# Patient Record
Sex: MALE | Race: BLACK OR AFRICAN AMERICAN | NOT HISPANIC OR LATINO | Employment: FULL TIME | URBAN - METROPOLITAN AREA
[De-identification: names, ages, dates, MRNs, and addresses within clinical notes are randomized per-mention and may not be internally consistent; named-entity substitution may affect disease eponyms.]

---

## 2017-07-17 ENCOUNTER — HOSPITAL ENCOUNTER (OUTPATIENT)
Dept: RADIOLOGY | Facility: HOSPITAL | Age: 42
Discharge: HOME | End: 2017-07-17
Attending: ORTHOPAEDIC SURGERY
Payer: COMMERCIAL

## 2017-07-17 ENCOUNTER — OFFICE VISIT (OUTPATIENT)
Dept: SPORTS MEDICINE | Facility: CLINIC | Age: 42
End: 2017-07-17
Payer: COMMERCIAL

## 2017-07-17 VITALS
DIASTOLIC BLOOD PRESSURE: 77 MMHG | SYSTOLIC BLOOD PRESSURE: 126 MMHG | BODY MASS INDEX: 34.04 KG/M2 | HEIGHT: 69 IN | HEART RATE: 64 BPM | WEIGHT: 229.81 LBS

## 2017-07-17 DIAGNOSIS — M75.22 BICEPS TENDINITIS OF LEFT UPPER EXTREMITY: ICD-10-CM

## 2017-07-17 DIAGNOSIS — M25.512 LEFT SHOULDER PAIN, UNSPECIFIED CHRONICITY: ICD-10-CM

## 2017-07-17 DIAGNOSIS — G89.29 CHRONIC LEFT SHOULDER PAIN: ICD-10-CM

## 2017-07-17 DIAGNOSIS — M25.512 CHRONIC LEFT SHOULDER PAIN: ICD-10-CM

## 2017-07-17 DIAGNOSIS — M25.512 LEFT SHOULDER PAIN, UNSPECIFIED CHRONICITY: Primary | ICD-10-CM

## 2017-07-17 DIAGNOSIS — S43.432A SLAP LESION OF LEFT SHOULDER: ICD-10-CM

## 2017-07-17 DIAGNOSIS — M75.101 TEAR OF RIGHT ROTATOR CUFF, UNSPECIFIED TEAR EXTENT: ICD-10-CM

## 2017-07-17 PROCEDURE — 99999 PR PBB SHADOW E&M-EST. PATIENT-LVL IV: CPT | Mod: PBBFAC,,, | Performed by: ORTHOPAEDIC SURGERY

## 2017-07-17 PROCEDURE — 99214 OFFICE O/P EST MOD 30 MIN: CPT | Mod: S$GLB,,, | Performed by: ORTHOPAEDIC SURGERY

## 2017-07-17 PROCEDURE — 73030 X-RAY EXAM OF SHOULDER: CPT | Mod: TC,PO,LT

## 2017-07-17 PROCEDURE — 73030 X-RAY EXAM OF SHOULDER: CPT | Mod: 26,LT,, | Performed by: RADIOLOGY

## 2017-07-17 RX ORDER — MELOXICAM 15 MG/1
15 TABLET ORAL DAILY
Qty: 30 TABLET | Refills: 1 | Status: SHIPPED | OUTPATIENT
Start: 2017-07-17 | End: 2017-08-14 | Stop reason: SDUPTHER

## 2017-07-17 NOTE — PATIENT INSTRUCTIONS
Understanding Biceps Tendonitis    A tendon is a strong band of tissue that connects muscle to bone. The biceps muscle is in the front of the upper arm. It helps with movements such as bending the elbow or raising the arm. The upper end of the biceps muscle is called the proximal end. It has two tendons called the long head and the short head. These tendons attach the muscle to the bones in the shoulder. Biceps tendonitis occurs when either of these tendons is irritated or red and swollen (inflamed). Most cases involve the long head.  Causes of biceps tendonitis  Causes can include:  · Wear and tear of the tendon from aging or normal use over time  · Overuse of the tendon from sports or work activities, especially those that involve repeated overhead movements  · Injury to the tendon from a fall or other accident  · Other problems in the shoulder, such as shoulder impingement or a rotator cuff tear  Symptoms of biceps tendonitis  Common symptoms include:  · Pain in the front of the shoulder that may also travel down the arm. The pain may be worse with activity and at night.  · Swelling in the shoulder  · Clicking or catching sensation when using the arm and shoulder  · Trouble moving the arm and shoulder  Treating biceps tendonitis  Treatment for biceps tendonitis may include:  · Resting the arm and shoulder. This involves limiting certain movements, such as reaching above the head or raising the arm. These can slow healing and make symptoms worse. You may also need to limit certain sports and types of work for a time.  · Cold therapy. This involves using items such as ice packs to help relieve symptoms. Cold can help reduce pain and swelling.  · Medicines. These help relieve pain and swelling. NSAIDs (nonsteroidal anti-inflammatory drugs) are the most common medicines used. Medicines may be prescribed or bought over-the-counter. They may be given as pills. Or they may be applied to the skin in the form of a gel,  cream, or patch.  · Injections of medicine into the injured area. These help relieve pain and swelling for a time.  · Physical therapy and exercises.  These help improve strength and range of motion in the arm and shoulder.  Possible complications  · If the tendon isnt given time to heal, symptoms may worsen. Also, the tendon may tear (rupture).  · If the tendon ruptures or doesnt get better with treatment, your healthcare provider may recommend surgery. This most often involves repairing and reattaching the tendon.     When to call your healthcare provider  Call your healthcare provider right away if you have any of these:  · Fever of 100.4°F (38°C) or higher, or as directed  · Symptoms that dont get better with treatment, or get worse  · Weakness or instability in the arm or shoulder  · Sudden sharp pain, bruising, swelling, popping or snapping sensation, or bulge in the upper arm or shoulder  · New symptoms   Date Last Reviewed: 3/10/2016  © 9112-2093 [employer]. [address] [place] [address]. All rights reserved. This information is not intended as a substitute for professional medical care. Always follow your healthcare professional's instructions.        SLAP Lesion of the Shoulder Joint  The shoulder is the most mobile joint in your body. The ball (or head) of the arm bone (humerus) rests in a shallow socket called the glenoid, much like a golf ball fits on a shelley. To help make the socket deeper, the outer rim of the glenoid is ringed by tough, flexible tissue called the labrum.    What Is a SLAP Lesion?  SLAP stands for Superior Labrum Anterior to Posterior. This means that the upper rim of the labrum has been torn from front to back. The tear happens where the biceps tendon anchors to the labrum. Common causes of a SLAP lesion include:  · Falling on an outstretched hand  · Forceful lifting  · Repeated overhead motions (such as throwing)  Diagnosing a SLAP lesion  To diagnose  the problem, your healthcare provider will examine your arm and shoulder. This includes moving your arm in certain directions to test for symptoms. Imaging tests, such as an MRI, arthrogram, or CT scan, may also be done to evaluate the lesion and to look for other injuries like fractures or rotator cuff tears. These provide your healthcare provider with a detailed view of the tissues inside your shoulder joint.   Treating a SLAP lesion  Rest and anti-inflammatory medicines are often the first line of treatment. Physical therapy can also be used to strengthen the muscles in the shoulder. This helps keep the joint stable. If these treatments arent enough, your healthcare provider may recommend surgery to repair the labrum.  Date Last Reviewed: 10/12/2015  © 3988-6142 [employer]. [address] [place] [address], [place] [address]. All rights reserved. This information is not intended as a substitute for professional medical care. Always follow your healthcare professional's instructions.        Shoulder Arthroscopy: Conditions Treated  You will be given instructions for how to prepare for your surgery and when you should arrive at the hospital or surgery center. Just before surgery, a doctor will talk to you about the anesthesia that will be used to keep you free of pain during surgery. You may be asked by several people to confirm which shoulder is being operated on. This is for your safety. Below are common shoulder problems and how they are treated during arthroscopy.       Impingement  · Repeated overhead movements can inflame your rotator cuff and bursa. A bone spur may also form. This causes pain and problems with certain arm movements. Impingement is also called bursitis or tendinitis.  · During surgery, an inflamed bursa may be removed. Bone may be trimmed. And the coracoacromial ligament may be detached. These steps make more room, relieving pressure and allowing the arm to move more freely.    Torn  rotator cuff  · A rotator cuff can tear due to a sudden injury or from overuse. This can cause pain, weakness, and loss of normal shoulder movement.  · During surgery, torn rotator cuff tendons may be trimmed. The tendons are then reattached to the humerus. This is done with stitches, anchors, or surgical tacks.    Stretched capsule  · A stretched capsule will remain loose. A loose capsule cant hold the joint firmly in place. The bones of the joint may feel like they move too much and the shoulder can dislocate.  · During arthroscopy, a stretched capsule is folded over itself and sutured in place. (This is done from inside the capsule.) This tightens the capsule, helping make the shoulder joint more stable.    Torn labrum  · The shoulder is a ball and socket joint.  The labrum normally supports and cushions the shoulder joint. In the case of a torn labrum, the socket that the ball (the upper end of your arm) fits into is not very deep. The shallow socket increases the  potential for instability.  · The labrum may tear off the rim of the glenoid. This can cause the joint to catch or feel like its slipping out of place. The shoulder may even dislocate.  · A torn labrum is repaired by reattaching it to the glenoid. This is often done with special anchors put into the glenoid bone. Sutures attached to the anchors are tied to hold the labrum in place. The joint then feels more stable.       Arthritis and loose bodies  · Arthritis is damage to joint cartilage with age and use. Injury or disease can also cause it. Wear and tear may also lead to loose bodies (pieces of bone or cartilage) or bone spurs in a joint.  · During surgery, bone spurs are removed. Rough parts of the joint are smoothed. Any loose body is removed from the joint. Bone may also be scraped or shaved to promote new cartilage growth.     Date Last Reviewed: 8/31/2015  © 6149-7836 [employer]. [address] [place] [address], [place] [address]. All  rights reserved. This information is not intended as a substitute for professional medical care. Always follow your healthcare professional's instructions.

## 2017-07-17 NOTE — PROGRESS NOTES
Subjective:          Chief Complaint: John Adair Barthelemy is a 42 y.o. male who had concerns including Pain of the Left Shoulder.    41 yo male complains of left shoulder pain after doing burpees 6 months ago; patient complains of night pain            Review of Systems   Constitution: Negative for fever and night sweats.   HENT: Negative for hearing loss.    Eyes: Negative for blurred vision and visual disturbance.   Cardiovascular: Negative for chest pain and leg swelling.   Respiratory: Negative for shortness of breath.    Endocrine: Negative for polyuria.   Hematologic/Lymphatic: Negative for bleeding problem.   Skin: Negative for rash.   Musculoskeletal: Negative for back pain, joint pain, joint swelling, muscle cramps and muscle weakness.   Gastrointestinal: Negative for melena.   Genitourinary: Negative for hematuria.   Neurological: Negative for loss of balance, numbness and paresthesias.   Psychiatric/Behavioral: Negative for altered mental status.       Pain Related Questions  Over the past 3 days, what was your average pain during activity? (I.e. running, jogging, walking, climbing stairs, getting dressed, ect.): 4  Over the past 3 days, what was your highest pain level?: 9  Over the past 3 days, what was your lowest pain level? : 1    Other  How many nights a week are you awakened by your affected body part?: 0  Was the patient's HEIGHT measured or patient reported?: Patient Reported  Was the patient's WEIGHT measured or patient reported?: Measured      Objective:        General: Jens is well-developed, well-nourished, appears stated age, in no acute distress, alert and oriented to time, place and person.     General    Vitals reviewed.  Constitutional: He is oriented to person, place, and time. He appears well-developed and well-nourished. No distress.   HENT:   Nose: Nose normal.   Mouth/Throat: No oropharyngeal exudate.   Eyes: Pupils are equal, round, and reactive to light. Right eye exhibits no  discharge. Left eye exhibits no discharge.   Neck: Normal range of motion.   Cardiovascular: Normal rate and intact distal pulses.    Pulmonary/Chest: Effort normal and breath sounds normal. No respiratory distress.   Neurological: He is alert and oriented to person, place, and time. He has normal reflexes. He displays normal reflexes. No cranial nerve deficit. Coordination normal.   Psychiatric: He has a normal mood and affect. His behavior is normal. Judgment and thought content normal.         Right Shoulder Exam     Inspection/Observation   Swelling: absent  Bruising: absent  Scars: absent  Deformity: absent  Scapular Winging: absent  Scapular Dyskinesia: negative  Atrophy: absent    Tenderness   The patient is experiencing no tenderness.        Range of Motion   Active Abduction:  90 normal   Passive Abduction:  100 normal   Extension:  0 normal   Forward Flexion:  180 normal   Forward Elevation: 180 normal  Adduction: 40 normal  External Rotation 0 degrees:  60 normal   External Rotation 90 degrees: 90 normal  Internal Rotation 0 degrees:  T10 normal   Internal Rotation 90 degrees:  30 normal     Tests & Signs   Apprehension: negative  Cross Arm: negative  Drop Arm: negative  Hawkin's test: negative  Impingement: negative  Sulcus: absent  Anterosuperior Escape: negative  Lag Sign 0 degrees: negative  Lag Sign 90 degrees: negative  Lift Off Sign: negative  Belly Press: negative  Active Compression Test ([place]'s Sign): negative  Yergason's Test: negative  Speed's Test: negative  Anterior Drawer Test: 1+   Posterior Drawer Test: 1+  Relocation 90 degrees: negative  Relocation > 90 degrees: negative  Bear Hug: negative  Moving Valgus: negative  Jerk Test: negative    Other   Sensation: normal    Left Shoulder Exam     Inspection/Observation   Swelling: absent  Bruising: absent  Scars: absent  Deformity: absent  Scapular Winging: absent  Scapular Dyskinesia: negative  Atrophy: absent    Tenderness   The patient  is experiencing no tenderness.         Range of Motion   Active Abduction:  90 normal   Passive Abduction:  100 normal   Extension:  0 normal   Forward Flexion:  160 normal   Forward Elevation: 160 normal  Adduction: 40 normal  External Rotation 0 degrees:  60 normal   External Rotation 90 degrees: 90 normal  Internal Rotation 0 degrees:  T10 normal   Internal Rotation 90 degrees:  30 normal     Tests & Signs   Apprehension: negative  Cross Arm: negative  Drop Arm: negative  Hawkin's test: negative  Impingement: negative  Sulcus: absent  Anterosuperior Escape: negative  Lag Sign 0 degrees: negative  Lag Sign 90 degrees: negative  Lift Off Sign: negative  Belly Press: negative  Active Compression test ([place]'s Sign): negative  Yergasons's Test: negative  Speed's Test: positive  Anterior Drawer Test: 1+  Posterior Drawer Test: 1+  Relocation 90 degrees: negative  Relocation > 90 degrees: negative  Bear Hug: negative  Moving Valgus: negative  Jerk Test: negative    Other   Sensation: normal       Muscle Strength   Right Upper Extremity   Shoulder Abduction: 5/5   Shoulder Internal Rotation: 5/5   Shoulder External Rotation: 5/5   Supraspinatus: 5/5/5   Subscapularis: 5/5/5   Biceps: 5/5/5   Left Upper Extremity  Shoulder Abduction: 5/5   Shoulder Internal Rotation: 5/5   Shoulder External Rotation: 5/5   Supraspinatus: 5/5/5   Subscapularis: 5/5/5   Biceps: 5/5/5     Reflexes     Left Side  Biceps:  2+  Triceps:  2+  Brachioradialis:  2+    Right Side   Biceps:  2+  Triceps:  2+  Brachioradialis:  2+    Vascular Exam     Right Pulses      Radial:                    2+      Left Pulses      Radial:                    2+      Capillary Refill  Right Hand: normal capillary refill  Left Hand: normal capillary refill      Radiographs: No signs of fracture or dislcoation            Assessment:       Encounter Diagnoses   Name Primary?    Left shoulder pain, unspecified chronicity Yes    Chronic left shoulder pain      Biceps tendinitis of left upper extremity     Tear of right rotator cuff, unspecified tear extent     SLAP lesion of left shoulder           Plan:         1. ASES and SF-12 was filled out today in clinic.     RTC in 3 weeks with Dr. Farzana Mayers. Patient will fill out ASES, SF-12 on return.    2. Pain control:     3. MRI left shoulder arthrogram        4. Patient is allergic to steroids                  [facility] patient questionnaires have been collected today.

## 2017-07-28 ENCOUNTER — HOSPITAL ENCOUNTER (OUTPATIENT)
Dept: RADIOLOGY | Facility: HOSPITAL | Age: 42
Discharge: HOME | End: 2017-07-28
Attending: ORTHOPAEDIC SURGERY
Payer: COMMERCIAL

## 2017-07-28 DIAGNOSIS — M75.101 TEAR OF RIGHT ROTATOR CUFF, UNSPECIFIED TEAR EXTENT: ICD-10-CM

## 2017-07-28 DIAGNOSIS — M25.512 LEFT SHOULDER PAIN, UNSPECIFIED CHRONICITY: ICD-10-CM

## 2017-07-28 DIAGNOSIS — M25.512 CHRONIC LEFT SHOULDER PAIN: ICD-10-CM

## 2017-07-28 DIAGNOSIS — M75.22 BICEPS TENDINITIS OF LEFT UPPER EXTREMITY: ICD-10-CM

## 2017-07-28 DIAGNOSIS — G89.29 CHRONIC LEFT SHOULDER PAIN: ICD-10-CM

## 2017-07-28 PROCEDURE — 25000003 PHARM REV CODE 250: Performed by: ORTHOPAEDIC SURGERY

## 2017-07-28 PROCEDURE — 73040 CONTRAST X-RAY OF SHOULDER: CPT | Mod: 26,,, | Performed by: RADIOLOGY

## 2017-07-28 PROCEDURE — A9585 GADOBUTROL INJECTION: HCPCS | Performed by: ORTHOPAEDIC SURGERY

## 2017-07-28 PROCEDURE — 23350 INJECTION FOR SHOULDER X-RAY: CPT | Mod: ,,, | Performed by: RADIOLOGY

## 2017-07-28 PROCEDURE — 23350 INJECTION FOR SHOULDER X-RAY: CPT | Mod: TC

## 2017-07-28 PROCEDURE — 73222 MRI JOINT UPR EXTREM W/DYE: CPT | Mod: 26,LT,, | Performed by: RADIOLOGY

## 2017-07-28 PROCEDURE — 25500020 PHARM REV CODE 255: Performed by: ORTHOPAEDIC SURGERY

## 2017-07-28 PROCEDURE — 73222 MRI JOINT UPR EXTREM W/DYE: CPT | Mod: TC,LT

## 2017-07-28 RX ORDER — GADOBUTROL 604.72 MG/ML
7 INJECTION INTRAVENOUS
Status: COMPLETED | OUTPATIENT
Start: 2017-07-28 | End: 2017-07-28

## 2017-07-28 RX ORDER — LIDOCAINE HYDROCHLORIDE 10 MG/ML
3 INJECTION, SOLUTION EPIDURAL; INFILTRATION; INTRACAUDAL; PERINEURAL ONCE
Status: COMPLETED | OUTPATIENT
Start: 2017-07-28 | End: 2017-07-28

## 2017-07-28 RX ADMIN — IOHEXOL 9 ML: 300 INJECTION, SOLUTION INTRAVENOUS at 03:07

## 2017-07-28 RX ADMIN — LIDOCAINE HYDROCHLORIDE 30 MG: 10 INJECTION, SOLUTION EPIDURAL; INFILTRATION; INTRACAUDAL; PERINEURAL at 03:07

## 2017-07-28 RX ADMIN — GADOBUTROL 7 ML: 604.72 INJECTION INTRAVENOUS at 03:07

## 2017-08-14 ENCOUNTER — OFFICE VISIT (OUTPATIENT)
Dept: SPORTS MEDICINE | Facility: CLINIC | Age: 42
End: 2017-08-14
Payer: COMMERCIAL

## 2017-08-14 VITALS
HEIGHT: 69 IN | DIASTOLIC BLOOD PRESSURE: 66 MMHG | SYSTOLIC BLOOD PRESSURE: 111 MMHG | WEIGHT: 220 LBS | BODY MASS INDEX: 32.58 KG/M2 | HEART RATE: 48 BPM

## 2017-08-14 DIAGNOSIS — M75.101 TEAR OF RIGHT ROTATOR CUFF, UNSPECIFIED TEAR EXTENT: ICD-10-CM

## 2017-08-14 DIAGNOSIS — G89.29 CHRONIC LEFT SHOULDER PAIN: ICD-10-CM

## 2017-08-14 DIAGNOSIS — S43.432A SLAP LESION OF LEFT SHOULDER: ICD-10-CM

## 2017-08-14 DIAGNOSIS — M25.512 LEFT SHOULDER PAIN, UNSPECIFIED CHRONICITY: ICD-10-CM

## 2017-08-14 DIAGNOSIS — M75.22 BICEPS TENDINITIS OF LEFT UPPER EXTREMITY: Primary | ICD-10-CM

## 2017-08-14 DIAGNOSIS — M25.512 CHRONIC LEFT SHOULDER PAIN: ICD-10-CM

## 2017-08-14 PROCEDURE — 3008F BODY MASS INDEX DOCD: CPT | Mod: S$GLB,,, | Performed by: ORTHOPAEDIC SURGERY

## 2017-08-14 PROCEDURE — 99214 OFFICE O/P EST MOD 30 MIN: CPT | Mod: S$GLB,,, | Performed by: ORTHOPAEDIC SURGERY

## 2017-08-14 PROCEDURE — 99999 PR PBB SHADOW E&M-EST. PATIENT-LVL IV: CPT | Mod: PBBFAC,,, | Performed by: ORTHOPAEDIC SURGERY

## 2017-08-14 RX ORDER — MELOXICAM 15 MG/1
15 TABLET ORAL DAILY
Qty: 30 TABLET | Refills: 3 | Status: SHIPPED | OUTPATIENT
Start: 2017-08-14 | End: 2018-11-23

## 2017-08-14 NOTE — PROGRESS NOTES
Subjective:          Chief Complaint: John Adair Barthelemy is a 42 y.o. male who had concerns including Follow-up of the Left Shoulder.    Patient is here for a follow up for his left shoulder to review his MRI results.             Review of Systems   Constitution: Negative for fever and night sweats.   HENT: Negative for hearing loss.    Eyes: Negative for blurred vision and visual disturbance.   Cardiovascular: Negative for chest pain and leg swelling.   Respiratory: Negative for shortness of breath.    Endocrine: Negative for polyuria.   Hematologic/Lymphatic: Negative for bleeding problem.   Skin: Negative for rash.   Musculoskeletal: Negative for back pain, joint pain, joint swelling, muscle cramps and muscle weakness.   Gastrointestinal: Negative for melena.   Genitourinary: Negative for hematuria.   Neurological: Negative for loss of balance, numbness and paresthesias.   Psychiatric/Behavioral: Negative for altered mental status.       Pain Related Questions  Over the past 3 days, what was your average pain during activity? (I.e. running, jogging, walking, climbing stairs, getting dressed, ect.): 8  Over the past 3 days, what was your highest pain level?: 8  Over the past 3 days, what was your lowest pain level? : 2    Other  How many nights a week are you awakened by your affected body part?: 4  Was the patient's HEIGHT measured or patient reported?: Patient Reported  Was the patient's WEIGHT measured or patient reported?: Measured      Objective:        General: Jens is well-developed, well-nourished, appears stated age, in no acute distress, alert and oriented to time, place and person.     General    Vitals reviewed.  Constitutional: He is oriented to person, place, and time. He appears well-developed and well-nourished. No distress.   HENT:   Nose: Nose normal.   Mouth/Throat: No oropharyngeal exudate.   Eyes: Pupils are equal, round, and reactive to light. Right eye exhibits no discharge. Left eye  exhibits no discharge.   Neck: Normal range of motion.   Cardiovascular: Normal rate and intact distal pulses.    Pulmonary/Chest: Effort normal and breath sounds normal. No respiratory distress.   Neurological: He is alert and oriented to person, place, and time. He has normal reflexes. He displays normal reflexes. No cranial nerve deficit. Coordination normal.   Psychiatric: He has a normal mood and affect. His behavior is normal. Judgment and thought content normal.         Right Shoulder Exam     Inspection/Observation   Swelling: absent  Bruising: absent  Scars: absent  Deformity: absent  Scapular Winging: absent  Scapular Dyskinesia: negative  Atrophy: absent    Tenderness   The patient is experiencing no tenderness.        Range of Motion   Active Abduction:  90 normal   Passive Abduction:  100 normal   Extension:  0 normal   Forward Flexion:  180 normal   Forward Elevation: 180 normal  Adduction: 40 normal  External Rotation 0 degrees:  60 normal   External Rotation 90 degrees: 90 normal  Internal Rotation 0 degrees:  T10 normal   Internal Rotation 90 degrees:  30 normal     Tests & Signs   Apprehension: negative  Cross Arm: negative  Drop Arm: negative  Hawkin's test: negative  Impingement: negative  Sulcus: absent  Anterosuperior Escape: negative  Lag Sign 0 degrees: negative  Lag Sign 90 degrees: negative  Lift Off Sign: negative  Belly Press: negative  Active Compression Test ([place]'s Sign): negative  Yergason's Test: negative  Speed's Test: negative  Anterior Drawer Test: 1+   Posterior Drawer Test: 1+  Relocation 90 degrees: negative  Relocation > 90 degrees: negative  Bear Hug: negative  Moving Valgus: negative  Jerk Test: negative    Other   Sensation: normal    Left Shoulder Exam     Inspection/Observation   Swelling: absent  Bruising: absent  Scars: absent  Deformity: absent  Scapular Winging: absent  Scapular Dyskinesia: negative  Atrophy: absent    Tenderness   The patient is experiencing no  tenderness.         Range of Motion   Active Abduction:  90 normal   Passive Abduction:  100 normal   Extension:  0 normal   Forward Flexion:  160 normal   Forward Elevation: 160 normal  Adduction: 40 normal  External Rotation 0 degrees:  60 normal   External Rotation 90 degrees: 90 normal  Internal Rotation 0 degrees:  T10 normal   Internal Rotation 90 degrees:  30 normal     Tests & Signs   Apprehension: negative  Cross Arm: negative  Drop Arm: negative  Hawkin's test: negative  Impingement: negative  Sulcus: absent  Anterosuperior Escape: negative  Lag Sign 0 degrees: negative  Lag Sign 90 degrees: negative  Lift Off Sign: negative  Belly Press: negative  Active Compression test ([place]'s Sign): negative  Yergasons's Test: negative  Speed's Test: positive  Anterior Drawer Test: 1+  Posterior Drawer Test: 1+  Relocation 90 degrees: negative  Relocation > 90 degrees: negative  Bear Hug: negative  Moving Valgus: negative  Jerk Test: negative    Other   Sensation: normal       Muscle Strength   Right Upper Extremity   Shoulder Abduction: 5/5   Shoulder Internal Rotation: 5/5   Shoulder External Rotation: 5/5   Supraspinatus: 5/5/5   Subscapularis: 5/5/5   Biceps: 5/5/5   Left Upper Extremity  Shoulder Abduction: 5/5   Shoulder Internal Rotation: 5/5   Shoulder External Rotation: 5/5   Supraspinatus: 5/5/5   Subscapularis: 5/5/5   Biceps: 5/5/5     Reflexes     Left Side  Biceps:  2+  Triceps:  2+  Brachioradialis:  2+    Right Side   Biceps:  2+  Triceps:  2+  Brachioradialis:  2+    Vascular Exam     Right Pulses      Radial:                    2+      Left Pulses      Radial:                    2+      Capillary Refill  Right Hand: normal capillary refill  Left Hand: normal capillary refill      MRI RESULTS  ROTATOR CUFF:  Supraspinatus: Intact  Infraspinatus:  Intact.  Subscapularis:  Intact.  Teres Minor:  Intact.    LABRUM:   There is no labral tear.    LONG HEAD BICEPS TENDON:  Located and intact.  The biceps  anchor is located posteriorly on the superior glenoid.  Suspect mild biceps tendinosis    BONES:  No focal or diffuse abnormality. No significant AC joint arthrosis.    CARTILAGE:  Preserved.    MUSCLES:  Normal bulk and signal    MR arthrogram LEFT shoulder     COMPARISON: None.    TECHNIQUE: Coronal and sagittal oblique fat sat T1 and fat sat T2; axial and ABER fat sat T1 sequences through the LEFT shoulder were acquired following the intra-articular administration of gadolinium.  Please see separate report for details of the injection.    FINDINGS:    ROTATOR CUFF:  Supraspinatus: Intact  Infraspinatus:  Intact.  Subscapularis:  Intact.  Teres Minor:  Intact.    LABRUM:   There is no labral tear.    LONG HEAD BICEPS TENDON:  Located and intact.  The biceps anchor is located posteriorly on the superior glenoid.  Suspect mild biceps tendinosis    BONES:  No focal or diffuse abnormality. No significant AC joint arthrosis.    CARTILAGE:  Preserved.    MUSCLES:  Normal bulk and signal   Impression          No evidence for rotator cuff tear or labral tear.  Mild biceps tendinosis.                 Assessment:       Encounter Diagnoses   Name Primary?    Biceps tendinitis of left upper extremity Yes    Chronic left shoulder pain           Plan:         1. ASES and SF-12 was not filled out today in clinic.     RTC in 6 weeks with Dr. Farzana Mayers. Patient will not fill out ASES, SF-12 on return.    2. Meloxicam 15 mg po qd prn    3. PT  For periscapular and RC program - [place] location    4. If no improvement  We reviewed with Jens today, the pathology and natural history of his diagnosis. We have discussed a variety of treatment options including medications, physical therapy and other alternative treatments. I also explained the indications, risks and benefits of surgery. After discussion, Jens decided to proceed with surgery. The decision was made to go forward with   1. Left shoulder arthroscopic bicep  tenodesis  2. Left shoulder arthroscopic debridement    The details of the surgical procedure were explained, including the location of probable incisions and a description of likely hardware and/or grafts to be used.  The patient understands the likely convalescence after surgery.  Also, we have thoroughly discussed the risks, benefits and alternatives to surgery, including, but not limited to, the risk of infection, joint stiffness, blood clot (including DVT and/or pulmonary embolus), neurologic and vascular injury.  It was explained that, if tissue has been repaired or reconstructed, there is a chance of failure, which may require further management.      All of the patient's questions were answered and informed consent was obtained. The patient will contact us if they have any questions or concerns in the interim.                  [facility] patient questionnaires have been collected today.

## 2017-08-22 ENCOUNTER — TELEPHONE (OUTPATIENT)
Dept: SPORTS MEDICINE | Facility: CLINIC | Age: 42
End: 2017-08-22

## 2017-08-22 DIAGNOSIS — G89.29 CHRONIC LEFT SHOULDER PAIN: ICD-10-CM

## 2017-08-22 DIAGNOSIS — M25.512 LEFT SHOULDER PAIN, UNSPECIFIED CHRONICITY: ICD-10-CM

## 2017-08-22 DIAGNOSIS — S43.432A SUPERIOR GLENOID LABRUM LESION OF LEFT SHOULDER, INITIAL ENCOUNTER: ICD-10-CM

## 2017-08-22 DIAGNOSIS — M25.512 CHRONIC LEFT SHOULDER PAIN: ICD-10-CM

## 2017-08-22 DIAGNOSIS — M75.101 TEAR OF RIGHT ROTATOR CUFF, UNSPECIFIED TEAR EXTENT: ICD-10-CM

## 2017-08-22 DIAGNOSIS — M75.22 BICEPS TENDONITIS ON LEFT: Primary | ICD-10-CM

## 2017-08-22 NOTE — TELEPHONE ENCOUNTER
----- Message from Nelly Uribe sent at 8/22/2017 11:18 AM CDT -----  Contact: [facility]  fax   [facility] states need orders and MRI for Left Shoulder fax to office. Patient has an appointment on tomorrow-8/23 @

## 2018-02-26 ENCOUNTER — OFFICE VISIT (OUTPATIENT)
Dept: SPORTS MEDICINE | Facility: CLINIC | Age: 43
End: 2018-02-26
Payer: COMMERCIAL

## 2018-02-26 VITALS
HEIGHT: 69 IN | DIASTOLIC BLOOD PRESSURE: 80 MMHG | SYSTOLIC BLOOD PRESSURE: 129 MMHG | WEIGHT: 220 LBS | BODY MASS INDEX: 32.58 KG/M2 | HEART RATE: 52 BPM

## 2018-02-26 DIAGNOSIS — M75.22 BICEPS TENDINITIS OF LEFT UPPER EXTREMITY: Primary | ICD-10-CM

## 2018-02-26 DIAGNOSIS — G89.29 CHRONIC LEFT SHOULDER PAIN: ICD-10-CM

## 2018-02-26 DIAGNOSIS — M25.512 CHRONIC LEFT SHOULDER PAIN: ICD-10-CM

## 2018-02-26 DIAGNOSIS — M75.102 ROTATOR CUFF SYNDROME OF LEFT SHOULDER: ICD-10-CM

## 2018-02-26 PROCEDURE — 20611 DRAIN/INJ JOINT/BURSA W/US: CPT | Mod: LT,S$GLB,, | Performed by: ORTHOPAEDIC SURGERY

## 2018-02-26 PROCEDURE — 20550 NJX 1 TENDON SHEATH/LIGAMENT: CPT | Mod: 59,LT,S$GLB, | Performed by: ORTHOPAEDIC SURGERY

## 2018-02-26 PROCEDURE — 3008F BODY MASS INDEX DOCD: CPT | Mod: S$GLB,,, | Performed by: ORTHOPAEDIC SURGERY

## 2018-02-26 PROCEDURE — 99214 OFFICE O/P EST MOD 30 MIN: CPT | Mod: 25,S$GLB,, | Performed by: ORTHOPAEDIC SURGERY

## 2018-02-26 PROCEDURE — 99999 PR PBB SHADOW E&M-EST. PATIENT-LVL III: CPT | Mod: PBBFAC,,, | Performed by: ORTHOPAEDIC SURGERY

## 2018-02-26 RX ORDER — TRIAMCINOLONE ACETONIDE 40 MG/ML
80 INJECTION, SUSPENSION INTRA-ARTICULAR; INTRAMUSCULAR
Status: COMPLETED | OUTPATIENT
Start: 2018-02-26 | End: 2018-02-26

## 2018-02-26 RX ORDER — TRIAMCINOLONE ACETONIDE 40 MG/ML
40 INJECTION, SUSPENSION INTRA-ARTICULAR; INTRAMUSCULAR
Status: COMPLETED | OUTPATIENT
Start: 2018-02-26 | End: 2018-02-26

## 2018-02-26 RX ADMIN — TRIAMCINOLONE ACETONIDE 80 MG: 40 INJECTION, SUSPENSION INTRA-ARTICULAR; INTRAMUSCULAR at 11:02

## 2018-02-26 RX ADMIN — TRIAMCINOLONE ACETONIDE 40 MG: 40 INJECTION, SUSPENSION INTRA-ARTICULAR; INTRAMUSCULAR at 11:02

## 2018-02-26 NOTE — PATIENT INSTRUCTIONS
Shoulder Arthroscopy: Conditions Treated  You will be given instructions for how to prepare for your surgery and when you should arrive at the hospital or surgery center. Just before surgery, a doctor will talk to you about the anesthesia that will be used to keep you free of pain during surgery. You may be asked by several people to confirm which shoulder is being operated on. This is for your safety. Below are common shoulder problems and how they are treated during arthroscopy.       Impingement  · Repeated overhead movements can inflame your rotator cuff and bursa. A bone spur may also form. This causes pain and problems with certain arm movements. Impingement is also called bursitis or tendinitis.  · During surgery, an inflamed bursa may be removed. Bone may be trimmed. And the coracoacromial ligament may be detached. These steps make more room, relieving pressure and allowing the arm to move more freely.    Torn rotator cuff  · A rotator cuff can tear due to a sudden injury or from overuse. This can cause pain, weakness, and loss of normal shoulder movement.  · During surgery, torn rotator cuff tendons may be trimmed. The tendons are then reattached to the humerus. This is done with stitches, anchors, or surgical tacks.    Stretched capsule  · A stretched capsule will remain loose. A loose capsule cant hold the joint firmly in place. The bones of the joint may feel like they move too much and the shoulder can dislocate.  · During arthroscopy, a stretched capsule is folded over itself and sutured in place. (This is done from inside the capsule.) This tightens the capsule, helping make the shoulder joint more stable.    Torn labrum  · The shoulder is a ball and socket joint.  The labrum normally supports and cushions the shoulder joint. In the case of a torn labrum, the socket that the ball (the upper end of your arm) fits into is not very deep. The shallow socket increases the  potential for  instability.  · The labrum may tear off the rim of the glenoid. This can cause the joint to catch or feel like its slipping out of place. The shoulder may even dislocate.  · A torn labrum is repaired by reattaching it to the glenoid. This is often done with special anchors put into the glenoid bone. Sutures attached to the anchors are tied to hold the labrum in place. The joint then feels more stable.       Arthritis and loose bodies  · Arthritis is damage to joint cartilage with age and use. Injury or disease can also cause it. Wear and tear may also lead to loose bodies (pieces of bone or cartilage) or bone spurs in a joint.  · During surgery, bone spurs are removed. Rough parts of the joint are smoothed. Any loose body is removed from the joint. Bone may also be scraped or shaved to promote new cartilage growth.  Date Last Reviewed: 8/31/2015  © 4711-4989 [employer]. [address] [place] [address]. All rights reserved. This information is not intended as a substitute for professional medical care. Always follow your healthcare professional's instructions.        Understanding Shoulder Impingement Syndrome    Shoulder impingement syndrome is a problem with the shoulder joint. It occurs when certain parts within the joint swell and are pinched. This can cause nagging pain and problems with moving the arm.  What causes shoulder impingement syndrome?  It is possible to develop impingement after years of normal shoulder use. But in most cases the condition occurs because of repeated overhead movements. These include such things as stocking shelves, painting, swimming, and throwing. These movements can irritate parts of the shoulder, leading to swelling. Swollen parts of the shoulder take up more room, making the joint space smaller. Some parts that may be involved include:  · A sac of fluid (bursa) that cushions the shoulder joint.  When the bursa is irritated, it may lead to a condition  called bursitis. This is when the bursa swells with fluid, filling and squeezing the joint space.  · Fibrous tissues (tendons) that connect muscle to bone. When tendons are irritated, they may become swollen. This is called tendonitis.  · The end (acromion) of the shoulder blade. This bone may be flat or hooked. If the acromion is hooked, the joint space may be smaller than normal. Growths (bone spurs) on the acromion can also narrow the joint space. Acromion problems dont often cause impingement. But they can make it worse.  Symptoms of shoulder impingement syndrome  Symptoms include pain, pinching, or stiffness in your shoulder. Pain often comes with movement, particularly reaching overhead or backward. It may also be felt when the shoulder is at rest. Pain at night during sleep is common.  Treatment for shoulder impingement syndrome  Treatment will depend on the cause of the problem, how bad the problem is, and if other parts of the shoulder are damaged. Treatment may include the following:  · Active rest. This allows the shoulder to heal. It means using the arm and shoulder, but avoiding activities that cause pain. These likely include reaching overhead or sleeping on your shoulder.  · Cold packs. These help reduce swelling and relieve pain.  · Prescription or over-the-counter pain medicines. These help relieve pain and swelling.  · Arm and shoulder exercises. These help keep your shoulder joint mobile as it heals. They also help improve muscle strength around the joint.  · Shots of medicine into the joint. This can help reduce swelling and pain for a short time.  If other measures dont work to relieve symptoms, you may need surgery. This opens up space in the joint to allow pain-free motion.  Possible complications of shoulder impingement syndrome  It might be tempting to stop using your shoulder completely to avoid pain. But doing so may lead to a condition called frozen shoulder. To help prevent this,  follow instructions you are given for active rest and for doing exercises to help your shoulder heal.  When to call your healthcare provider  Call your healthcare provider right away if you have any of these:  · Fever of 100.4°F (38°C) or higher, or as directed  · Symptoms that dont get better, or get worse  · New symptoms   Date Last Reviewed: 3/10/2016  © 0726-6184 [employer]. [address] [place] [address], [place] [address]. All rights reserved. This information is not intended as a substitute for professional medical care. Always follow your healthcare professional's instructions.        Surgery for Shoulder Impingement  For many people, nonoperative treatments are enough to relieve shoulder impingement symptoms. But if these and other treatments havent worked, surgery may be an option. Surgery can help free up the joint space, allowing pain-free motion. Talk to your healthcare provider to see if surgery is right for you.  Surgery for shoulder impingement  The type of surgery you have depends on your shoulder problem. Surgery can remove the bursa if it is swollen. If the coracoacromial ligament is tight, it may be released. If the acromion is hooked or has bone spurs, a portion of it may be removed. Before surgery, youll be given medicine to keep you free from pain. There are different types of surgery, arthroscopy and open surgery:  · During arthroscopy, small incisions are made in the shoulder. Next, a small, lighted instrument (arthroscope) is inserted. A tiny camera is attached on one end of the arthroscope. The camera sends images to a video monitor, allowing the surgeon to see inside the shoulder.  · During open surgery, incisions are made in the shoulder so the surgeon can work inside.     A swollen bursa may be removed.           A tight coracoacromial ligament may be released.           A portion of the acromion may be removed.   Risks and complications of surgery  Your healthcare provider  will discuss the possible risks and complications of the procedure with you. These may include:  · Infection  · Damage to nerves or blood vessels  · Loss of flexibility  · Symptoms do not completely resolve  Date Last Reviewed: 10/12/2015  © 8591-3554 [employer]. [address] [place] [address], [place] [address]. All rights reserved. This information is not intended as a substitute for professional medical care. Always follow your healthcare professional's instructions.

## 2018-02-26 NOTE — PROGRESS NOTES
Subjective:          Chief Complaint: John Adair Barthelemy is a 42 y.o. male who had concerns including Pain of the Left Shoulder.    Patient is here for a follow up for his left shoulder. He did PT at [facility] with little to no improvement. He does feel like the bicep tendon area is better but still has a lot of pain with overhead and quick motions. He had some relief with an patch his PT put on his shoulder but he does not know what it was.         Pain   Pertinent negatives include no chest pain, fever, joint swelling, numbness or rash.       Review of Systems   Constitution: Negative for fever and night sweats.   HENT: Negative for hearing loss.    Eyes: Negative for blurred vision and visual disturbance.   Cardiovascular: Negative for chest pain and leg swelling.   Respiratory: Negative for shortness of breath.    Endocrine: Negative for polyuria.   Hematologic/Lymphatic: Negative for bleeding problem.   Skin: Negative for rash.   Musculoskeletal: Negative for back pain, joint pain, joint swelling, muscle cramps and muscle weakness.   Gastrointestinal: Negative for melena.   Genitourinary: Negative for hematuria.   Neurological: Negative for loss of balance, numbness and paresthesias.   Psychiatric/Behavioral: Negative for altered mental status.       Pain Related Questions  Over the past 3 days, what was your average pain during activity? (I.e. running, jogging, walking, climbing stairs, getting dressed, ect.): 6  Over the past 3 days, what was your highest pain level?: 6  Over the past 3 days, what was your lowest pain level? : 1    Other  How many nights a week are you awakened by your affected body part?: 0  Was the patient's HEIGHT measured or patient reported?: Patient Reported  Was the patient's WEIGHT measured or patient reported?: Measured      Objective:        General: Jens is well-developed, well-nourished, appears stated age, in no acute distress, alert and oriented to time, place and person.      General    Vitals reviewed.  Constitutional: He is oriented to person, place, and time. He appears well-developed and well-nourished. No distress.   HENT:   Nose: Nose normal.   Mouth/Throat: No oropharyngeal exudate.   Eyes: Pupils are equal, round, and reactive to light. Right eye exhibits no discharge. Left eye exhibits no discharge.   Neck: Normal range of motion.   Cardiovascular: Normal rate and intact distal pulses.    Pulmonary/Chest: Effort normal and breath sounds normal. No respiratory distress.   Neurological: He is alert and oriented to person, place, and time. He has normal reflexes. He displays normal reflexes. No cranial nerve deficit. Coordination normal.   Psychiatric: He has a normal mood and affect. His behavior is normal. Judgment and thought content normal.         Right Shoulder Exam     Inspection/Observation   Swelling: absent  Bruising: absent  Scars: absent  Deformity: absent  Scapular Winging: absent  Scapular Dyskinesia: negative  Atrophy: absent    Tenderness   The patient is experiencing no tenderness.        Range of Motion   Active Abduction:  90 normal   Passive Abduction:  100 normal   Extension:  0 normal   Forward Flexion:  180 normal   Forward Elevation: 180 normal  Adduction: 40 normal  External Rotation 0 degrees:  60 normal   External Rotation 90 degrees: 90 normal  Internal Rotation 0 degrees:  T10 normal   Internal Rotation 90 degrees:  30 normal     Tests & Signs   Apprehension: negative  Cross Arm: negative  Drop Arm: negative  Hawkin's test: negative  Impingement: negative  Sulcus: absent  Anterosuperior Escape: negative  Lag Sign 0 degrees: negative  Lag Sign 90 degrees: negative  Lift Off Sign: negative  Belly Press: negative  Active Compression Test ([place]'s Sign): negative  Yergason's Test: negative  Speed's Test: negative  Anterior Drawer Test: 1+   Posterior Drawer Test: 1+  Relocation 90 degrees: negative  Relocation > 90 degrees: negative  Bear Hug:  negative  Moving Valgus: negative  Jerk Test: negative    Other   Sensation: normal    Left Shoulder Exam     Inspection/Observation   Swelling: absent  Bruising: absent  Scars: absent  Deformity: absent  Scapular Winging: absent  Scapular Dyskinesia: negative  Atrophy: absent    Tenderness   The patient is experiencing no tenderness.         Range of Motion   Active Abduction:  90 normal   Passive Abduction:  100 normal   Extension:  0 normal   Forward Flexion:  160 normal   Forward Elevation: 160 normal  Adduction: 40 normal  External Rotation 0 degrees:  60 normal   External Rotation 90 degrees: 90 normal  Internal Rotation 0 degrees:  T10 normal   Internal Rotation 90 degrees:  30 normal     Tests & Signs   Apprehension: negative  Cross Arm: negative  Drop Arm: negative  Hawkin's test: negative  Impingement: negative  Sulcus: absent  Anterosuperior Escape: negative  Lag Sign 0 degrees: negative  Lag Sign 90 degrees: negative  Lift Off Sign: negative  Belly Press: negative  Active Compression test ([place]'s Sign): negative  Yergasons's Test: negative  Speed's Test: positive  Anterior Drawer Test: 1+  Posterior Drawer Test: 1+  Relocation 90 degrees: negative  Relocation > 90 degrees: negative  Bear Hug: negative  Moving Valgus: negative  Jerk Test: negative    Other   Sensation: normal       Muscle Strength   Right Upper Extremity   Shoulder Abduction: 5/5   Shoulder Internal Rotation: 5/5   Shoulder External Rotation: 5/5   Supraspinatus: 5/5/5   Subscapularis: 5/5/5   Biceps: 5/5/5   Left Upper Extremity  Shoulder Abduction: 5/5   Shoulder Internal Rotation: 5/5   Shoulder External Rotation: 5/5   Supraspinatus: 5/5/5   Subscapularis: 5/5/5   Biceps: 5/5/5     Reflexes     Left Side  Biceps:  2+  Triceps:  2+  Brachioradialis:  2+    Right Side   Biceps:  2+  Triceps:  2+  Brachioradialis:  2+    Vascular Exam     Right Pulses      Radial:                    2+      Left Pulses      Radial:                     2+      Capillary Refill  Right Hand: normal capillary refill  Left Hand: normal capillary refill      MRI RESULTS  ROTATOR CUFF:  Supraspinatus: Intact  Infraspinatus:  Intact.  Subscapularis:  Intact.  Teres Minor:  Intact.    LABRUM:   There is no labral tear.    LONG HEAD BICEPS TENDON:  Located and intact.  The biceps anchor is located posteriorly on the superior glenoid.  Suspect mild biceps tendinosis    BONES:  No focal or diffuse abnormality. No significant AC joint arthrosis.    CARTILAGE:  Preserved.    MUSCLES:  Normal bulk and signal    MR arthrogram LEFT shoulder     COMPARISON: None.    TECHNIQUE: Coronal and sagittal oblique fat sat T1 and fat sat T2; axial and ABER fat sat T1 sequences through the LEFT shoulder were acquired following the intra-articular administration of gadolinium.  Please see separate report for details of the injection.    FINDINGS:    ROTATOR CUFF:  Supraspinatus: Intact  Infraspinatus:  Intact.  Subscapularis:  Intact.  Teres Minor:  Intact.    LABRUM:   There is no labral tear.    LONG HEAD BICEPS TENDON:  Located and intact.  The biceps anchor is located posteriorly on the superior glenoid.  Suspect mild biceps tendinosis    BONES:  No focal or diffuse abnormality. No significant AC joint arthrosis.    CARTILAGE:  Preserved.    MUSCLES:  Normal bulk and signal   Impression          No evidence for rotator cuff tear or labral tear.  Mild biceps tendinosis.                 Assessment:       Encounter Diagnoses   Name Primary?    Biceps tendinitis of left upper extremity Yes    Chronic left shoulder pain     Rotator cuff syndrome of left shoulder           Plan:         1. ASES and SF-12 was not filled out today in clinic.     RTC in 6 weeks with Dr. Farzana Mayers. Patient will not fill out ASES, SF-12 on return.    2. Injection Procedure left shoulder subacromial space    After time out was performed, including verification of patient ID, procedure, site and side,  availability of information and equipment, review of safety issues, and agreement with consent, the procedure site was marked and the patient was prepped aseptically. A diagnostic and therapeutic injection of 5cc Kenalog/Marcaine and bupivacaine was given under sterile technique using a 22g x 1.5 needle into the left Subacromial in seated position.     The patient had no adverse reactions to the medication. Pain decreased. The patient was instructed to apply ice to the joint for 20 minutes and avoid strenuous activities for 24-36 hours following the injection. Patient was warned of possible blood sugar and/or blood pressure changes during that time. Following that time, patient can resume regular activities.    Patient was reminded to call the clinic immediately for any adverse side effects as explained in clinic today.    3.  Injection Procedure left shoulder biceps tendon sheath    After time out was performed, including verification of patient ID, procedure, site and side, availability of information and equipment, review of safety issues, and agreement with consent, the procedure site was marked and the patient was prepped aseptically. A diagnostic and therapeutic injection of 5cc Kenalog/Marcaine and bupivacaine was given under sterile technique using a 22g x 1.5 needle into the left Subacromial in seated position.     The patient had no adverse reactions to the medication. Pain decreased. The patient was instructed to apply ice to the joint for 20 minutes and avoid strenuous activities for 24-36 hours following the injection. Patient was warned of possible blood sugar and/or blood pressure changes during that time. Following that time, patient can resume regular activities.    Patient was reminded to call the clinic immediately for any adverse side effects as explained in clinic today.      Ultrasound Guidance Procedure  left Subacromial visualized with documented inflammation. Dynamic visualization of the 22g  x 1.5 needle performed.      3. We reviewed with Jens today, the pathology and natural history of his diagnosis. We have discussed a variety of treatment options including medications, physical therapy and other alternative treatments. I also explained the indications, risks and benefits of surgery. After discussion, Jens decided to proceed with surgery. The decision was made to go forward with   1. Left shoulder arthroscopic rotator cuff debridement and subacromial decompression  2. Left shoulder arthroscopic suprapectoral bicep tenodesis  3. Left shoulder arthroscopic labral debridement  4. Left shoulder Amniox Arthrocentesis, [id number]    The details of the surgical procedure were explained, including the location of probable incisions and a description of likely hardware and/or grafts to be used.  The patient understands the likely convalescence after surgery.  Also, we have thoroughly discussed the risks, benefits and alternatives to surgery, including, but not limited to, the risk of infection, joint stiffness, blood clot (including DVT and/or pulmonary embolus), neurologic and vascular injury.  It was explained that, if tissue has been repaired or reconstructed, there is a chance of failure, which may require further management.      All of the patient's questions were answered and informed consent was obtained. The patient will contact us if they have any questions or concerns in the interim.                  [facility] patient questionnaires have been collected today.

## 2018-11-01 ENCOUNTER — CLINICAL SUPPORT (OUTPATIENT)
Dept: OTHER | Facility: CLINIC | Age: 43
End: 2018-11-01
Payer: COMMERCIAL

## 2018-11-01 DIAGNOSIS — Z00.8 ENCOUNTER FOR OTHER GENERAL EXAMINATION: ICD-10-CM

## 2018-11-01 PROCEDURE — 82947 ASSAY GLUCOSE BLOOD QUANT: CPT | Mod: QW,S$GLB,, | Performed by: INTERNAL MEDICINE

## 2018-11-01 PROCEDURE — 80061 LIPID PANEL: CPT | Mod: QW,S$GLB,, | Performed by: INTERNAL MEDICINE

## 2018-11-01 PROCEDURE — 99401 PREV MED CNSL INDIV APPRX 15: CPT | Mod: S$GLB,,, | Performed by: INTERNAL MEDICINE

## 2018-11-02 VITALS — HEIGHT: 69 IN | BODY MASS INDEX: 32.49 KG/M2

## 2018-11-02 LAB
HDLC SERPL-MCNC: 42 MG/DL
POC CHOLESTEROL, LDL (DOCK): 83 MG/DL
POC CHOLESTEROL, TOTAL: 142 MG/DL
POC GLUCOSE, FASTING: 100 MG/DL
TRIGL SERPL-MCNC: 83 MG/DL

## 2018-11-23 ENCOUNTER — LAB VISIT (OUTPATIENT)
Dept: LAB | Facility: HOSPITAL | Age: 43
End: 2018-11-23
Attending: INTERNAL MEDICINE
Payer: COMMERCIAL

## 2018-11-23 ENCOUNTER — OFFICE VISIT (OUTPATIENT)
Dept: INTERNAL MEDICINE | Facility: CLINIC | Age: 43
End: 2018-11-23
Payer: COMMERCIAL

## 2018-11-23 VITALS
TEMPERATURE: 98 F | SYSTOLIC BLOOD PRESSURE: 118 MMHG | HEART RATE: 76 BPM | BODY MASS INDEX: 36.08 KG/M2 | RESPIRATION RATE: 16 BRPM | DIASTOLIC BLOOD PRESSURE: 70 MMHG | WEIGHT: 243.63 LBS | HEIGHT: 69 IN

## 2018-11-23 DIAGNOSIS — B35.1 ONYCHOMYCOSIS: ICD-10-CM

## 2018-11-23 DIAGNOSIS — M10.071 ACUTE IDIOPATHIC GOUT OF RIGHT FOOT: ICD-10-CM

## 2018-11-23 DIAGNOSIS — M10.071 ACUTE IDIOPATHIC GOUT OF RIGHT FOOT: Primary | ICD-10-CM

## 2018-11-23 DIAGNOSIS — E78.2 MIXED HYPERLIPIDEMIA: ICD-10-CM

## 2018-11-23 LAB
ALBUMIN SERPL BCP-MCNC: 3.9 G/DL
ALP SERPL-CCNC: 53 U/L
ALT SERPL W/O P-5'-P-CCNC: 24 U/L
AST SERPL-CCNC: 24 U/L
BILIRUB DIRECT SERPL-MCNC: 0.2 MG/DL
BILIRUB SERPL-MCNC: 0.4 MG/DL
PROT SERPL-MCNC: 7.3 G/DL
URATE SERPL-MCNC: 6.6 MG/DL

## 2018-11-23 PROCEDURE — 3008F BODY MASS INDEX DOCD: CPT | Mod: CPTII,S$GLB,, | Performed by: INTERNAL MEDICINE

## 2018-11-23 PROCEDURE — 36415 COLL VENOUS BLD VENIPUNCTURE: CPT | Mod: PO

## 2018-11-23 PROCEDURE — 84550 ASSAY OF BLOOD/URIC ACID: CPT

## 2018-11-23 PROCEDURE — 80076 HEPATIC FUNCTION PANEL: CPT

## 2018-11-23 PROCEDURE — 99204 OFFICE O/P NEW MOD 45 MIN: CPT | Mod: S$GLB,,, | Performed by: INTERNAL MEDICINE

## 2018-11-23 PROCEDURE — 99999 PR PBB SHADOW E&M-EST. PATIENT-LVL III: CPT | Mod: PBBFAC,,, | Performed by: INTERNAL MEDICINE

## 2018-11-23 RX ORDER — COLCHICINE 0.6 MG/1
0.6 TABLET ORAL 2 TIMES DAILY
Qty: 60 TABLET | Refills: 11 | Status: SHIPPED | OUTPATIENT
Start: 2018-11-23 | End: 2019-11-23

## 2018-11-23 RX ORDER — TERBINAFINE HYDROCHLORIDE 250 MG/1
250 TABLET ORAL DAILY
Qty: 90 TABLET | Refills: 0 | Status: SHIPPED | OUTPATIENT
Start: 2018-11-23 | End: 2019-02-21

## 2018-11-23 NOTE — PATIENT INSTRUCTIONS

## 2018-11-23 NOTE — PROGRESS NOTES
Subjective:       Patient ID: John Adair Barthelemy is a 43 y.o. male.    Chief Complaint: Establish Care    HPI  Patient is a 43-year-old male presenting days new patient.  Patient comes in with some concerns about possible gout.  He states about 3 weeks ago he started having swelling pain and redness of his right foot at the base of his toes.  It was most predominant the 2nd 3rd and 4th toes.  He went to an urgent care and they told him he probably had gout in the put him on an anti-inflammatory.  He got minimal relief.  He had a follow-up visit with the 2nd urgent care and they shot x-rays which showed no significant abnormalities and told him he likely had gout and to continue the anti-inflammatory.  He has family history of gout in his father but he had not had gout attacks prior to this scenario.    He also notes that he has some toenail focus on the right great toe.  He wanted to discuss treatment options there.  We discussed Lamisil treatment and he does wish to pursue that.    He also relates a diagnosis of hyperlipidemia.  He states he was told in the past he had some elevated cholesterol.  He was on Crestor for this.  He was taking 10 mg of Crestor daily but he stopped that about a month or so ago.  He is not sure what his numbers may have been.  He is not sure if where he needs to go back on the cholesterol medication at this time.    Review of Systems   Constitutional: Negative for fever and unexpected weight change.   HENT: Negative for hearing loss, postnasal drip and rhinorrhea.    Eyes: Negative for pain and visual disturbance.   Respiratory: Negative for cough, shortness of breath and wheezing.    Cardiovascular: Negative for chest pain and palpitations.   Gastrointestinal: Negative for constipation, diarrhea, nausea and vomiting.   Genitourinary: Negative for dysuria and hematuria.   Musculoskeletal: Positive for arthralgias. Negative for back pain, myalgias and neck stiffness.   Skin: Negative  "for pallor and rash.   Neurological: Negative for seizures, syncope and headaches.   Hematological: Negative for adenopathy.   Psychiatric/Behavioral: Negative for dysphoric mood. The patient is not nervous/anxious.        Objective:   /70 (BP Location: Left arm, Patient Position: Sitting, BP Method: Medium (Automatic))   Pulse 76   Temp 98.3 °F (36.8 °C) (Tympanic)   Resp 16   Ht 5' 9" (1.753 m)   Wt 110.5 kg (243 lb 9.7 oz)   BMI 35.97 kg/m²      Physical Exam   Constitutional: He is oriented to person, place, and time. He appears well-developed and well-nourished. No distress.   HENT:   Head: Normocephalic and atraumatic.   Mouth/Throat: Oropharynx is clear and moist.   Eyes: EOM are normal. Pupils are equal, round, and reactive to light.   Neck: Normal range of motion. Neck supple. No JVD present. No thyromegaly present.   Cardiovascular: Normal rate, regular rhythm, normal heart sounds and intact distal pulses. Exam reveals no gallop and no friction rub.   No murmur heard.  Pulmonary/Chest: Effort normal and breath sounds normal. He has no wheezes. He has no rales.   Abdominal: Soft. Bowel sounds are normal. He exhibits no distension. There is no tenderness. There is no rebound and no guarding.   Musculoskeletal: Normal range of motion. He exhibits no edema.   Examination of the right foot reveals swelling along the MTP region from toes to 3 and 4.  Mild erythema and warmth are noted. There is onychomycosis on the great toe.  Pulses are intact. Sensation is intact.   Lymphadenopathy:     He has no cervical adenopathy.   Neurological: He is alert and oriented to person, place, and time. He has normal reflexes. No cranial nerve deficit.   Skin: Skin is warm and dry. No rash noted.   Psychiatric: He has a normal mood and affect. Judgment normal.   Vitals reviewed.          Assessment:       1. Acute idiopathic gout of right foot    2. Onychomycosis    3. Mixed hyperlipidemia        Plan:   No " problem-specific Assessment & Plan notes found for this encounter.    Jens was seen today for establish care.    Diagnoses and all orders for this visit:    Acute idiopathic gout of right foot  Comments:  colchicine 0.6 mg twice daily for gout. Taper the medication as you respond to therapy.  check uric acid  Orders:  -     colchicine (COLCRYS) 0.6 mg tablet; Take 1 tablet (0.6 mg total) by mouth 2 (two) times daily.  -     Uric acid; Future  -     CBC auto differential; Future  -     Comprehensive metabolic panel; Future  -     Lipid panel; Future    Onychomycosis  Comments:  lamasil 250 mg daily for 12 weeks.  Avoid alcohol and tylenol while on medication.  Check liver functions during treatment.  Orders:  -     Cancel: Hepatic function panel; Future  -     Hepatic function panel; Future  -     Hepatic function panel; Future  -     terbinafine HCl (LAMISIL) 250 mg tablet; Take 1 tablet (250 mg total) by mouth once daily.    Mixed hyperlipidemia  Comments:  Remain off crestor for now.  Repeat labs in 3 months and follow up after labs          Follow-up in about 3 months (around 2/27/2019).

## 2019-01-02 ENCOUNTER — LAB VISIT (OUTPATIENT)
Dept: LAB | Facility: HOSPITAL | Age: 44
End: 2019-01-02
Attending: INTERNAL MEDICINE
Payer: COMMERCIAL

## 2019-01-02 DIAGNOSIS — B35.1 ONYCHOMYCOSIS: ICD-10-CM

## 2019-01-02 LAB
ALBUMIN SERPL BCP-MCNC: 3.9 G/DL
ALP SERPL-CCNC: 55 U/L
ALT SERPL W/O P-5'-P-CCNC: 32 U/L
AST SERPL-CCNC: 26 U/L
BILIRUB DIRECT SERPL-MCNC: <0.1 MG/DL
BILIRUB SERPL-MCNC: 0.3 MG/DL
PROT SERPL-MCNC: 7.3 G/DL

## 2019-01-02 PROCEDURE — 36415 COLL VENOUS BLD VENIPUNCTURE: CPT | Mod: PO

## 2019-01-02 PROCEDURE — 80076 HEPATIC FUNCTION PANEL: CPT

## 2019-03-12 ENCOUNTER — TELEPHONE (OUTPATIENT)
Dept: INTERNAL MEDICINE | Facility: CLINIC | Age: 44
End: 2019-03-12

## 2019-03-12 NOTE — TELEPHONE ENCOUNTER
----- Message from Alannah Willis sent at 3/12/2019 10:00 AM CDT -----  Contact: self  Type:  Patient Returning Call    Who Called:patient  Who Left Message for Patient:ms. culver  Does the patient know what this is regarding?:unsure  Would the patient rather a call back or a response via [facility]? call  Best Call Back Number:[phone]  Additional Information:

## 2019-06-19 ENCOUNTER — HOSPITAL ENCOUNTER (OUTPATIENT)
Dept: RADIOLOGY | Facility: HOSPITAL | Age: 44
Discharge: HOME | End: 2019-06-19
Attending: ORTHOPAEDIC SURGERY
Payer: COMMERCIAL

## 2019-06-19 ENCOUNTER — OFFICE VISIT (OUTPATIENT)
Dept: ORTHOPEDICS | Facility: CLINIC | Age: 44
End: 2019-06-19
Payer: COMMERCIAL

## 2019-06-19 VITALS
RESPIRATION RATE: 16 BRPM | BODY MASS INDEX: 36.08 KG/M2 | WEIGHT: 243.63 LBS | HEIGHT: 69 IN | SYSTOLIC BLOOD PRESSURE: 112 MMHG | HEART RATE: 60 BPM | DIASTOLIC BLOOD PRESSURE: 72 MMHG

## 2019-06-19 DIAGNOSIS — M94.269 CHONDROMALACIA OF KNEE, UNSPECIFIED LATERALITY: Primary | ICD-10-CM

## 2019-06-19 DIAGNOSIS — M25.562 LEFT KNEE PAIN, UNSPECIFIED CHRONICITY: ICD-10-CM

## 2019-06-19 DIAGNOSIS — M25.562 LEFT KNEE PAIN, UNSPECIFIED CHRONICITY: Primary | ICD-10-CM

## 2019-06-19 DIAGNOSIS — M22.42 CHONDROMALACIA PATELLAE OF LEFT KNEE: ICD-10-CM

## 2019-06-19 DIAGNOSIS — M10.9 PODAGRA: Primary | ICD-10-CM

## 2019-06-19 PROCEDURE — 73564 XR KNEE ORTHO LEFT WITH FLEXION: ICD-10-PCS | Mod: 26,LT,, | Performed by: RADIOLOGY

## 2019-06-19 PROCEDURE — 99204 PR OFFICE/OUTPT VISIT, NEW, LEVL IV, 45-59 MIN: ICD-10-PCS | Mod: S$GLB,,, | Performed by: ORTHOPAEDIC SURGERY

## 2019-06-19 PROCEDURE — 73562 XR KNEE ORTHO LEFT WITH FLEXION: ICD-10-PCS | Mod: 26,59,LT, | Performed by: RADIOLOGY

## 2019-06-19 PROCEDURE — 99999 PR PBB SHADOW E&M-EST. PATIENT-LVL III: CPT | Mod: PBBFAC,,, | Performed by: ORTHOPAEDIC SURGERY

## 2019-06-19 PROCEDURE — 73564 X-RAY EXAM KNEE 4 OR MORE: CPT | Mod: 26,LT,, | Performed by: RADIOLOGY

## 2019-06-19 PROCEDURE — 73562 X-RAY EXAM OF KNEE 3: CPT | Mod: TC,LT

## 2019-06-19 PROCEDURE — 3008F PR BODY MASS INDEX (BMI) DOCUMENTED: ICD-10-PCS | Mod: CPTII,S$GLB,, | Performed by: ORTHOPAEDIC SURGERY

## 2019-06-19 PROCEDURE — 73562 X-RAY EXAM OF KNEE 3: CPT | Mod: 26,59,LT, | Performed by: RADIOLOGY

## 2019-06-19 PROCEDURE — 99999 PR PBB SHADOW E&M-EST. PATIENT-LVL III: ICD-10-PCS | Mod: PBBFAC,,, | Performed by: ORTHOPAEDIC SURGERY

## 2019-06-19 PROCEDURE — 99204 OFFICE O/P NEW MOD 45 MIN: CPT | Mod: S$GLB,,, | Performed by: ORTHOPAEDIC SURGERY

## 2019-06-19 PROCEDURE — 3008F BODY MASS INDEX DOCD: CPT | Mod: CPTII,S$GLB,, | Performed by: ORTHOPAEDIC SURGERY

## 2019-06-19 RX ORDER — METHYLPREDNISOLONE 4 MG/1
TABLET ORAL
Qty: 1 PACKAGE | Refills: 0 | Status: SHIPPED | OUTPATIENT
Start: 2019-06-19 | End: 2019-07-10

## 2019-06-19 RX ORDER — MELOXICAM 15 MG/1
15 TABLET ORAL DAILY
Qty: 30 TABLET | Refills: 2 | Status: SHIPPED | OUTPATIENT
Start: 2019-06-19 | End: 2022-01-19

## 2019-06-19 NOTE — PROGRESS NOTES
Subjective:     Patient ID: John Adair Barthelemy is a 43 y.o. male.    Chief Complaint: Pain of the Left Knee    John Adair Barthelemy is a 43 y.o. Male here for left knee pain.     Knee Pain    The pain is present in the left knee. This is a recurrent problem. The current episode started more than 1 year ago. The problem occurs intermittently. The problem has been gradually worsening. The quality of the pain is described as tightness and throbbing. The pain is at a severity of 3/10. Associated symptoms include joint swelling, numbness and stiffness. Pertinent negatives include no fever. The symptoms are aggravated by activity, bending and extension. He has tried injection treatment for the symptoms. The treatment provided significant relief. Physical therapy was not tried.      History reviewed. No pertinent past medical history.  History reviewed. No pertinent surgical history.  Family History   Problem Relation Age of Onset    No Known Problems Mother     Heart disease Father 39        Bypass    No Known Problems Sister     Cancer Paternal Grandmother      Social History     Socioeconomic History    Marital status:      Spouse name: Not on file    Number of children: 2    Years of education: Not on file    Highest education level: Not on file   Occupational History    Occupation: Educator   Social Needs    Financial resource strain: Not on file    Food insecurity:     Worry: Not on file     Inability: Not on file    Transportation needs:     Medical: Not on file     Non-medical: Not on file   Tobacco Use    Smoking status: Never Smoker    Smokeless tobacco: Never Used   Substance and Sexual Activity    Alcohol use: Yes     Alcohol/week: 3.6 oz     Types: 6 Cans of beer per week     Frequency: Never     Comment: socially     Drug use: No    Sexual activity: Yes     Partners: Female   Lifestyle    Physical activity:     Days per week: Not on file     Minutes per session: Not on file     Stress: Not on file   Relationships    Social connections:     Talks on phone: Not on file     Gets together: Not on file     Attends Taoist service: Not on file     Active member of club or organization: Not on file     Attends meetings of clubs or organizations: Not on file     Relationship status: Not on file   Other Topics Concern    Not on file   Social History Narrative    Not on file     Medication List with Changes/Refills   New Medications    MELOXICAM (MOBIC) 15 MG TABLET    Take 1 tablet (15 mg total) by mouth once daily.    METHYLPREDNISOLONE (MEDROL DOSEPACK) 4 MG TABLET    use as directed   Current Medications    COLCHICINE (COLCRYS) 0.6 MG TABLET    Take 1 tablet (0.6 mg total) by mouth 2 (two) times daily.     Review of patient's allergies indicates:   Allergen Reactions    Betamethasone Other (See Comments)     Hiccups     Review of Systems   Constitution: Negative for fever and night sweats.   HENT: Negative for hearing loss.    Eyes: Negative for blurred vision and visual disturbance.   Cardiovascular: Negative for chest pain and leg swelling.   Respiratory: Negative for shortness of breath.    Endocrine: Negative for polyuria.   Hematologic/Lymphatic: Negative for bleeding problem.   Skin: Negative for rash.   Musculoskeletal: Positive for joint pain, joint swelling and stiffness. Negative for back pain, muscle cramps and muscle weakness.   Gastrointestinal: Negative for melena.   Genitourinary: Negative for hematuria.   Neurological: Positive for numbness. Negative for loss of balance and paresthesias.   Psychiatric/Behavioral: Negative for altered mental status.       Objective:   Body mass index is 35.97 kg/m².  Vitals:    06/19/19 1313   BP: 112/72   Pulse: 60   Resp: 16           General    Vitals reviewed.  Constitutional: He is oriented to person, place, and time. He appears well-developed and well-nourished. No distress.   HENT:   Mouth/Throat: No oropharyngeal exudate.   Eyes:  Right eye exhibits no discharge. Left eye exhibits no discharge.   Neck: Normal range of motion.   Pulmonary/Chest: Effort normal. No respiratory distress.   Neurological: He is alert and oriented to person, place, and time. He has normal reflexes. No cranial nerve deficit. Coordination normal.   Psychiatric: He has a normal mood and affect. His behavior is normal. Judgment and thought content normal.           Right Knee Exam     Inspection   Erythema: absent  Scars: absent  Swelling: absent  Effusion: absent  Deformity: absent  Bruising: absent    Tenderness   The patient is experiencing no tenderness.     Range of Motion   Extension: 0   Flexion: 130     Tests   Meniscus   Maryann:  Medial - negative Lateral - negative  Ligament Examination Lachman: normal (-1 to 2mm) PCL-Posterior Drawer: normal (0 to 2mm)     MCL - Valgus: normal (0 to 2mm)  LCL - Varus: normal  Patella   Patellar apprehension: negative  Passive Patellar Tilt: neutral  Patellar Tracking: normal  Patellar Glide (quadrants): Lateral - 1   Medial - 2  Q-Angle at 90 degrees: normal  Patellar Grind: negative    Other   Meniscal Cyst: absent  Popliteal (Baker's) Cyst: absent  Sensation: normal    Left Knee Exam     Inspection   Erythema: absent  Scars: absent  Swelling: absent  Effusion: absent  Deformity: absent  Bruising: absent    Tenderness   The patient tender to palpation of the condyle and patella.    Range of Motion   Extension: 0   Flexion: 130     Tests   Meniscus   Maryann:  Medial - negative Lateral - negative  Stability Lachman: normal (-1 to 2mm) PCL-Posterior Drawer: normal (0 to 2mm)  MCL - Valgus: normal (0 to 2mm)  LCL - Varus: normal (0 to 2mm)  Patella   Patellar apprehension: negative  Passive Patellar Tilt: neutral  Patellar Tracking: normal  Patellar Glide (Quadrants): Lateral - 1 Medial - 2  Q-Angle at 90 degrees: normal  Patellar Grind: positive    Other   Meniscal Cyst: absent  Popliteal (Baker's) Cyst: absent  Sensation:  normal    Right Hip Exam     Tests   Bisi: negative  Left Hip Exam     Tests   Bisi: negative          Muscle Strength   Right Lower Extremity   Hip Abduction: 5/5   Quadriceps:  5/5   Hamstrin/5   Left Lower Extremity   Hip Abduction: 5/5   Quadriceps:  4/5   Hamstrin/5     Reflexes     Left Side  Quadriceps:  2+  Achilles:  2+    Right Side   Quadriceps:  2+  Achilles:  2+    Vascular Exam     Right Pulses  Dorsalis Pedis:      2+  Posterior Tibial:      2+        Left Pulses  Dorsalis Pedis:      2+  Posterior Tibial:      2+            Imaging reviewed and interpreted today by me X-ray Knee Ortho Left with Flexion  Narrative: EXAMINATION:  XR KNEE ORTHO LEFT WITH FLEXION    CLINICAL HISTORY:  . Pain in left knee    TECHNIQUE:  AP standing view of both knees, PA flexion standing views of both knees, and Merchant views of both knees were performed. A lateral view of the left knee was also performed.    COMPARISON:  2016 .    FINDINGS:  knees demonstrate a similar appearance without acute abnormality. Joint spaces maintained without significant degenerative findings. No large joint effusion.  Enthesophyte formation noted at the insertion of the left patellar tendon.  Impression: As above    Electronically signed by: Flash Sanon MD  Date:    2019  Time:    12:20      Assessment:     Encounter Diagnosis   Name Primary?    Chondromalacia of knee, unspecified laterality Yes        Plan:     We reviewed with Jens today, the pathology and natural history of his diagnosis. We had an extensive discussion as to the conservative treatment and management of their condition. We also discussed the variety of treatment options to include medication, physical therapy, diagnostic testing as well as other treatments.The decision was made to go forward with:    -Pt  -Medrol, then mobic  -podiatry for podagra  -can follow up 3 months                  Disclaimer: This note was prepared using a voice  recognition system and is likely to have sound alike errors within the text.

## 2019-07-31 ENCOUNTER — OFFICE VISIT (OUTPATIENT)
Dept: ORTHOPEDICS | Facility: CLINIC | Age: 44
End: 2019-07-31
Payer: COMMERCIAL

## 2019-07-31 VITALS
DIASTOLIC BLOOD PRESSURE: 78 MMHG | HEART RATE: 55 BPM | BODY MASS INDEX: 35.99 KG/M2 | HEIGHT: 69 IN | SYSTOLIC BLOOD PRESSURE: 129 MMHG | WEIGHT: 243 LBS

## 2019-07-31 DIAGNOSIS — M94.269 CHONDROMALACIA OF KNEE, UNSPECIFIED LATERALITY: ICD-10-CM

## 2019-07-31 DIAGNOSIS — S76.311A STRAIN OF RIGHT HAMSTRING, INITIAL ENCOUNTER: Primary | ICD-10-CM

## 2019-07-31 DIAGNOSIS — S76.319A HAMSTRING STRAIN, UNSPECIFIED LATERALITY, INITIAL ENCOUNTER: Primary | ICD-10-CM

## 2019-07-31 PROCEDURE — 3008F BODY MASS INDEX DOCD: CPT | Mod: CPTII,S$GLB,, | Performed by: ORTHOPAEDIC SURGERY

## 2019-07-31 PROCEDURE — 99214 PR OFFICE/OUTPT VISIT, EST, LEVL IV, 30-39 MIN: ICD-10-PCS | Mod: S$GLB,,, | Performed by: ORTHOPAEDIC SURGERY

## 2019-07-31 PROCEDURE — 3008F PR BODY MASS INDEX (BMI) DOCUMENTED: ICD-10-PCS | Mod: CPTII,S$GLB,, | Performed by: ORTHOPAEDIC SURGERY

## 2019-07-31 PROCEDURE — 99214 OFFICE O/P EST MOD 30 MIN: CPT | Mod: S$GLB,,, | Performed by: ORTHOPAEDIC SURGERY

## 2019-07-31 PROCEDURE — 99999 PR PBB SHADOW E&M-EST. PATIENT-LVL III: CPT | Mod: PBBFAC,,, | Performed by: ORTHOPAEDIC SURGERY

## 2019-07-31 PROCEDURE — 99999 PR PBB SHADOW E&M-EST. PATIENT-LVL III: ICD-10-PCS | Mod: PBBFAC,,, | Performed by: ORTHOPAEDIC SURGERY

## 2019-07-31 RX ORDER — METHYLPREDNISOLONE 4 MG/1
TABLET ORAL
Qty: 1 PACKAGE | Refills: 0 | Status: SHIPPED | OUTPATIENT
Start: 2019-07-31 | End: 2022-01-19

## 2019-07-31 NOTE — PROGRESS NOTES
Subjective:     Patient ID: John Adair Barthelemy is a 44 y.o. male.    Chief Complaint: Pain and Follow-up of the Right Knee    John Adair Barthelemy is a 44 y.o. male here for right knee pain. Did multiple activities over the weekend including fishing, began having some pain on the back side of his knee.    Knee Pain    The pain is present in the right knee. This is a new problem. The current episode started 1 to 4 weeks ago. The problem occurs constantly. The problem has been gradually worsening. The quality of the pain is described as shooting, tingling and throbbing. The pain is at a severity of 10/10. Associated symptoms include a limited range of motion and stiffness. Pertinent negatives include no fever or numbness. The symptoms are aggravated by activity, walking, bearing weight and flexion. He has tried heat and cold for the symptoms. The treatment provided no relief. Physical therapy was not tried.      History reviewed. No pertinent past medical history.  History reviewed. No pertinent surgical history.  Family History   Problem Relation Age of Onset    No Known Problems Mother     Heart disease Father 39        Bypass    No Known Problems Sister     Cancer Paternal Grandmother      Social History     Socioeconomic History    Marital status:      Spouse name: Not on file    Number of children: 2    Years of education: Not on file    Highest education level: Not on file   Occupational History    Occupation: Educator   Social Needs    Financial resource strain: Not on file    Food insecurity:     Worry: Not on file     Inability: Not on file    Transportation needs:     Medical: Not on file     Non-medical: Not on file   Tobacco Use    Smoking status: Never Smoker    Smokeless tobacco: Never Used   Substance and Sexual Activity    Alcohol use: Yes     Alcohol/week: 3.6 oz     Types: 6 Cans of beer per week     Frequency: Never     Comment: socially     Drug use: No    Sexual  activity: Yes     Partners: Female   Lifestyle    Physical activity:     Days per week: Not on file     Minutes per session: Not on file    Stress: Not on file   Relationships    Social connections:     Talks on phone: Not on file     Gets together: Not on file     Attends Zoroastrian service: Not on file     Active member of club or organization: Not on file     Attends meetings of clubs or organizations: Not on file     Relationship status: Not on file   Other Topics Concern    Not on file   Social History Narrative    Not on file     Medication List with Changes/Refills   New Medications    METHYLPREDNISOLONE (MEDROL DOSEPACK) 4 MG TABLET    use as directed   Current Medications    COLCHICINE (COLCRYS) 0.6 MG TABLET    Take 1 tablet (0.6 mg total) by mouth 2 (two) times daily.    MELOXICAM (MOBIC) 15 MG TABLET    Take 1 tablet (15 mg total) by mouth once daily.     Review of patient's allergies indicates:   Allergen Reactions    Betamethasone Other (See Comments)     Hiccups     Review of Systems   Constitution: Negative for chills and fever.   HENT: Negative for ear discharge and hearing loss.    Eyes: Negative for blurred vision and visual disturbance.   Cardiovascular: Negative for chest pain and leg swelling.   Respiratory: Negative for cough and shortness of breath.    Endocrine: Negative for polyuria.   Hematologic/Lymphatic: Negative for bleeding problem.   Skin: Negative for rash.   Musculoskeletal: Positive for joint pain and stiffness. Negative for back pain, joint swelling, muscle cramps and muscle weakness.   Gastrointestinal: Negative for nausea and vomiting.   Genitourinary: Negative for hematuria.   Neurological: Negative for loss of balance, numbness and paresthesias.   Psychiatric/Behavioral: Negative for altered mental status.       Objective:   Body mass index is 35.88 kg/m².  Vitals:    07/31/19 1055   BP: 129/78   Pulse: (!) 55                General    Vitals reviewed.  Constitutional:  He is oriented to person, place, and time. He appears well-developed and well-nourished. No distress.   HENT:   Mouth/Throat: No oropharyngeal exudate.   Eyes: Right eye exhibits no discharge. Left eye exhibits no discharge.   Neck: Normal range of motion.   Pulmonary/Chest: Effort normal. No respiratory distress.   Neurological: He is alert and oriented to person, place, and time. He has normal reflexes. No cranial nerve deficit. Coordination normal.   Psychiatric: He has a normal mood and affect. His behavior is normal. Judgment and thought content normal.           Right Knee Exam     Inspection   Erythema: absent  Scars: absent  Swelling: absent  Effusion: absent  Deformity: absent  Bruising: absent    Tenderness   The patient is tender to palpation of the iliotibial band and medial hamstring.    Range of Motion   Extension: 5   Flexion: 110     Tests   Meniscus   Maryann:  Medial - negative Lateral - negative  Ligament Examination Lachman: normal (-1 to 2mm) PCL-Posterior Drawer: normal (0 to 2mm)     MCL - Valgus: normal (0 to 2mm)  LCL - Varus: normal  Patella   Patellar apprehension: negative  Passive Patellar Tilt: neutral  Patellar Tracking: normal  Patellar Glide (quadrants): Lateral - 1   Medial - 2  Q-Angle at 90 degrees: normal  Patellar Grind: negative    Other   Popliteal (Baker's) Cyst: absent  Muscle Tightness: hamstring tightness  Sensation: normal    Comments:   tenderness over hamstring musculature, IT band    No calf tenderness    Left Knee Exam     Inspection   Erythema: absent  Scars: absent  Swelling: absent  Effusion: absent  Deformity: absent  Bruising: absent    Tenderness   The patient is experiencing no tenderness.     Range of Motion   Extension: 0   Flexion: 130     Tests   Meniscus   Maryann:  Medial - negative Lateral - negative  Stability Lachman: normal (-1 to 2mm) PCL-Posterior Drawer: normal (0 to 2mm)  MCL - Valgus: normal (0 to 2mm)  LCL - Varus: normal (0 to 2mm)  Patella    Patellar apprehension: negative  Passive Patellar Tilt: neutral  Patellar Tracking: normal  Patellar Glide (Quadrants): Lateral - 1 Medial - 2  Q-Angle at 90 degrees: normal  Patellar Grind: negative    Other   Meniscal Cyst: absent  Popliteal (Baker's) Cyst: absent  Sensation: normal    Right Hip Exam     Tests   Bisi: positive  Left Hip Exam     Tests   Bisi: negative          Muscle Strength   Right Lower Extremity   Hip Abduction: 4/5   Quadriceps:  4/5   Hamstrin/5   Left Lower Extremity   Hip Abduction: 5/5   Quadriceps:  5/5   Hamstrin/5     Vascular Exam     Right Pulses  Dorsalis Pedis:      2+  Posterior Tibial:      2+        Left Pulses  Dorsalis Pedis:      2+  Posterior Tibial:      2+            Relevant imaging results reviewed and interpreted by me, discussed with the patient and / or family today X-ray Knee Ortho Left with Flexion  Narrative: EXAMINATION:  XR KNEE ORTHO LEFT WITH FLEXION    CLINICAL HISTORY:  . Pain in left knee    TECHNIQUE:  AP standing view of both knees, PA flexion standing views of both knees, and Merchant views of both knees were performed. A lateral view of the left knee was also performed.    COMPARISON:  2016 .    FINDINGS:  knees demonstrate a similar appearance without acute abnormality. Joint spaces maintained without significant degenerative findings. No large joint effusion.  Enthesophyte formation noted at the insertion of the left patellar tendon.  Impression: As above    Electronically signed by: Flash Sanon MD  Date:    2019  Time:    12:20      Assessment:     Encounter Diagnosis   Name Primary?    Strain of right hamstring, initial encounter Yes        Plan:       We reviewed with Jens today, the pathology and natural history of his diagnosis. We had an extensive discussion as to the conservative treatment and management of their condition. We also discussed the variety of treatment options to include medication, physical therapy,  diagnostic testing as well as other treatments.The decision was made to go forward with:    -Medrol Dosepak  -PT for hamstrings\  -f/up PA 6wk                    Disclaimer: This note was prepared using a voice recognition system and is likely to have sound alike errors within the text.

## 2019-10-17 ENCOUNTER — CLINICAL SUPPORT (OUTPATIENT)
Dept: OTHER | Facility: CLINIC | Age: 44
End: 2019-10-17
Payer: COMMERCIAL

## 2019-10-17 DIAGNOSIS — Z00.8 ENCOUNTER FOR OTHER GENERAL EXAMINATION: ICD-10-CM

## 2019-10-17 PROCEDURE — 99401 PREV MED CNSL INDIV APPRX 15: CPT | Mod: S$GLB,,, | Performed by: INTERNAL MEDICINE

## 2019-10-17 PROCEDURE — 80061 PR  LIPID PANEL: ICD-10-PCS | Mod: QW,S$GLB,, | Performed by: INTERNAL MEDICINE

## 2019-10-17 PROCEDURE — 82947 ASSAY GLUCOSE BLOOD QUANT: CPT | Mod: QW,S$GLB,, | Performed by: INTERNAL MEDICINE

## 2019-10-17 PROCEDURE — 80061 LIPID PANEL: CPT | Mod: QW,S$GLB,, | Performed by: INTERNAL MEDICINE

## 2019-10-17 PROCEDURE — 82947 PR  ASSAY QUANTITATIVE,BLOOD GLUCOSE: ICD-10-PCS | Mod: QW,S$GLB,, | Performed by: INTERNAL MEDICINE

## 2019-10-17 PROCEDURE — 99401 PR PREVENT COUNSEL,INDIV,15 MIN: ICD-10-PCS | Mod: S$GLB,,, | Performed by: INTERNAL MEDICINE

## 2019-10-18 VITALS — BODY MASS INDEX: 35.88 KG/M2 | HEIGHT: 69 IN

## 2019-10-18 LAB
HDLC SERPL-MCNC: 33 MG/DL
POC CHOLESTEROL, LDL (DOCK): 177 MG/DL
POC CHOLESTEROL, TOTAL: 245 MG/DL
POC GLUCOSE, FASTING: 107 MG/DL (ref 60–110)
TRIGL SERPL-MCNC: 173 MG/DL

## 2021-12-22 ENCOUNTER — TELEPHONE (OUTPATIENT)
Dept: INTERNAL MEDICINE | Facility: CLINIC | Age: 46
End: 2021-12-22
Payer: COMMERCIAL

## 2022-01-19 ENCOUNTER — OFFICE VISIT (OUTPATIENT)
Dept: INTERNAL MEDICINE | Facility: CLINIC | Age: 47
End: 2022-01-19
Payer: COMMERCIAL

## 2022-01-19 ENCOUNTER — LAB VISIT (OUTPATIENT)
Dept: LAB | Facility: HOSPITAL | Age: 47
End: 2022-01-19
Attending: INTERNAL MEDICINE
Payer: COMMERCIAL

## 2022-01-19 VITALS
OXYGEN SATURATION: 95 % | DIASTOLIC BLOOD PRESSURE: 80 MMHG | TEMPERATURE: 98 F | BODY MASS INDEX: 38.51 KG/M2 | SYSTOLIC BLOOD PRESSURE: 110 MMHG | HEART RATE: 53 BPM | WEIGHT: 260.81 LBS

## 2022-01-19 DIAGNOSIS — M10.071 IDIOPATHIC GOUT INVOLVING TOE OF RIGHT FOOT, UNSPECIFIED CHRONICITY: ICD-10-CM

## 2022-01-19 DIAGNOSIS — Z00.00 ROUTINE GENERAL MEDICAL EXAMINATION AT HEALTH CARE FACILITY: Primary | ICD-10-CM

## 2022-01-19 DIAGNOSIS — Z12.11 COLON CANCER SCREENING: ICD-10-CM

## 2022-01-19 DIAGNOSIS — Z00.00 ROUTINE GENERAL MEDICAL EXAMINATION AT HEALTH CARE FACILITY: ICD-10-CM

## 2022-01-19 PROBLEM — M22.42 CHONDROMALACIA PATELLAE OF LEFT KNEE: Status: RESOLVED | Noted: 2019-06-19 | Resolved: 2022-01-19

## 2022-01-19 PROBLEM — M25.512 CHRONIC LEFT SHOULDER PAIN: Status: RESOLVED | Noted: 2017-07-17 | Resolved: 2022-01-19

## 2022-01-19 PROBLEM — M75.102 ROTATOR CUFF SYNDROME OF LEFT SHOULDER: Status: RESOLVED | Noted: 2018-02-26 | Resolved: 2022-01-19

## 2022-01-19 PROBLEM — G89.29 CHRONIC LEFT SHOULDER PAIN: Status: RESOLVED | Noted: 2017-07-17 | Resolved: 2022-01-19

## 2022-01-19 PROBLEM — S76.311A STRAIN OF RIGHT HAMSTRING: Status: RESOLVED | Noted: 2019-07-31 | Resolved: 2022-01-19

## 2022-01-19 PROBLEM — M75.22 BICEPS TENDINITIS OF LEFT UPPER EXTREMITY: Status: RESOLVED | Noted: 2017-07-17 | Resolved: 2022-01-19

## 2022-01-19 PROBLEM — M94.269 CHONDROMALACIA OF KNEE: Status: RESOLVED | Noted: 2019-06-19 | Resolved: 2022-01-19

## 2022-01-19 LAB
ALBUMIN SERPL BCP-MCNC: 4.1 G/DL (ref 3.5–5.2)
ALP SERPL-CCNC: 63 U/L (ref 55–135)
ALT SERPL W/O P-5'-P-CCNC: 26 U/L (ref 10–44)
ANION GAP SERPL CALC-SCNC: 8 MMOL/L (ref 8–16)
AST SERPL-CCNC: 22 U/L (ref 10–40)
BILIRUB SERPL-MCNC: 0.5 MG/DL (ref 0.1–1)
BUN SERPL-MCNC: 11 MG/DL (ref 6–20)
CALCIUM SERPL-MCNC: 9.9 MG/DL (ref 8.7–10.5)
CHLORIDE SERPL-SCNC: 104 MMOL/L (ref 95–110)
CHOLEST SERPL-MCNC: 264 MG/DL (ref 120–199)
CHOLEST/HDLC SERPL: 5.4 {RATIO} (ref 2–5)
CO2 SERPL-SCNC: 27 MMOL/L (ref 23–29)
CREAT SERPL-MCNC: 1.1 MG/DL (ref 0.5–1.4)
EST. GFR  (AFRICAN AMERICAN): >60 ML/MIN/1.73 M^2
EST. GFR  (NON AFRICAN AMERICAN): >60 ML/MIN/1.73 M^2
GLUCOSE SERPL-MCNC: 100 MG/DL (ref 70–110)
HDLC SERPL-MCNC: 49 MG/DL (ref 40–75)
HDLC SERPL: 18.6 % (ref 20–50)
LDLC SERPL CALC-MCNC: 189.2 MG/DL (ref 63–159)
NONHDLC SERPL-MCNC: 215 MG/DL
POTASSIUM SERPL-SCNC: 4.6 MMOL/L (ref 3.5–5.1)
PROT SERPL-MCNC: 7.8 G/DL (ref 6–8.4)
SODIUM SERPL-SCNC: 139 MMOL/L (ref 136–145)
TRIGL SERPL-MCNC: 129 MG/DL (ref 30–150)
URATE SERPL-MCNC: 8.9 MG/DL (ref 3.4–7)

## 2022-01-19 PROCEDURE — 99999 PR PBB SHADOW E&M-EST. PATIENT-LVL IV: CPT | Mod: PBBFAC,,, | Performed by: INTERNAL MEDICINE

## 2022-01-19 PROCEDURE — 3079F PR MOST RECENT DIASTOLIC BLOOD PRESSURE 80-89 MM HG: ICD-10-PCS | Mod: CPTII,S$GLB,, | Performed by: INTERNAL MEDICINE

## 2022-01-19 PROCEDURE — 36415 COLL VENOUS BLD VENIPUNCTURE: CPT | Mod: PO | Performed by: INTERNAL MEDICINE

## 2022-01-19 PROCEDURE — 3008F PR BODY MASS INDEX (BMI) DOCUMENTED: ICD-10-PCS | Mod: CPTII,S$GLB,, | Performed by: INTERNAL MEDICINE

## 2022-01-19 PROCEDURE — 3079F DIAST BP 80-89 MM HG: CPT | Mod: CPTII,S$GLB,, | Performed by: INTERNAL MEDICINE

## 2022-01-19 PROCEDURE — 3008F BODY MASS INDEX DOCD: CPT | Mod: CPTII,S$GLB,, | Performed by: INTERNAL MEDICINE

## 2022-01-19 PROCEDURE — 1159F MED LIST DOCD IN RCRD: CPT | Mod: CPTII,S$GLB,, | Performed by: INTERNAL MEDICINE

## 2022-01-19 PROCEDURE — 80061 LIPID PANEL: CPT | Performed by: INTERNAL MEDICINE

## 2022-01-19 PROCEDURE — 1160F RVW MEDS BY RX/DR IN RCRD: CPT | Mod: CPTII,S$GLB,, | Performed by: INTERNAL MEDICINE

## 2022-01-19 PROCEDURE — 1160F PR REVIEW ALL MEDS BY PRESCRIBER/CLIN PHARMACIST DOCUMENTED: ICD-10-PCS | Mod: CPTII,S$GLB,, | Performed by: INTERNAL MEDICINE

## 2022-01-19 PROCEDURE — 99999 PR PBB SHADOW E&M-EST. PATIENT-LVL IV: ICD-10-PCS | Mod: PBBFAC,,, | Performed by: INTERNAL MEDICINE

## 2022-01-19 PROCEDURE — 99386 PR PREVENTIVE VISIT,NEW,40-64: ICD-10-PCS | Mod: S$GLB,,, | Performed by: INTERNAL MEDICINE

## 2022-01-19 PROCEDURE — 99386 PREV VISIT NEW AGE 40-64: CPT | Mod: S$GLB,,, | Performed by: INTERNAL MEDICINE

## 2022-01-19 PROCEDURE — 80053 COMPREHEN METABOLIC PANEL: CPT | Performed by: INTERNAL MEDICINE

## 2022-01-19 PROCEDURE — 3074F PR MOST RECENT SYSTOLIC BLOOD PRESSURE < 130 MM HG: ICD-10-PCS | Mod: CPTII,S$GLB,, | Performed by: INTERNAL MEDICINE

## 2022-01-19 PROCEDURE — 84550 ASSAY OF BLOOD/URIC ACID: CPT | Performed by: INTERNAL MEDICINE

## 2022-01-19 PROCEDURE — 1159F PR MEDICATION LIST DOCUMENTED IN MEDICAL RECORD: ICD-10-PCS | Mod: CPTII,S$GLB,, | Performed by: INTERNAL MEDICINE

## 2022-01-19 PROCEDURE — 3074F SYST BP LT 130 MM HG: CPT | Mod: CPTII,S$GLB,, | Performed by: INTERNAL MEDICINE

## 2022-01-19 RX ORDER — ALLOPURINOL 300 MG/1
300 TABLET ORAL DAILY
Qty: 90 TABLET | Refills: 3 | Status: SHIPPED | OUTPATIENT
Start: 2022-01-19 | End: 2023-06-26

## 2022-01-19 NOTE — PATIENT INSTRUCTIONS
Patient Education       Low Purine Diet   About this topic   Purines are a natural substance in some foods. They help many systems in our body like our heart and our digestive system. They are also the building blocks for our DNA. Because of some illnesses, you may need to limit the amount of purines in your diet.  General   A low purine diet limits the amount of purines you eat. When your body digests purines, a waste product called uric acid is made. Uric acid crystals can build up in your joints causing a painful kind of arthritis called gout. When you limit foods high in purines, you may be able to prevent painful attacks.  What will the results be?   This diet may help you:  · Have fewer problems with gout  · Have fewer kidney stones  · Lower the amount of uric acid in your body  Who should use this diet?   This diet is used by people who have high uric acid levels. It is also used by people who have a history of uric acid kidney stones.  Who should not use this diet?   You should not use this food plan if you have not talked with your doctor about a low purine diet.  What foods are good to eat?   · Water, tea, coffee, cocoa  · Popcorn  · Low fat or fat free milk, low fat yogurt, low fat cheese  · Eggs, nuts, peanut butter  · Vegetables (except those that are moderate in purines)  · Potatoes, bread, rice, pasta  · Fruits (especially cherries) and fruit juices  · Condiments like salt, herbs, olives, pickles, relishes, and vinegar  What foods should be limited or avoided?   Foods that are high in purines and those that raise uric acid levels include:  · Beer  · Gravies and sauces made with meat  · Anchovies, sardines, caviar, herring, mussels, tuna, codfish, scallops, trout, and amber  · Foods high in fat like urrutia, red meats, fatty poultry, dark meats, skin of poultry, high-fat dairy products  · Organ meats like liver, kidney, tripe, and sweetbreads  · Wild game like veal, venison, and duck  · High fructose  corn syrup in foods and drinks  · Yeast  Foods that are moderate in purines include:  · Oatmeal. Limit to 2/3 cup (65 g) uncooked each day.  · Wheat bran and wheat germ. Limit to 1/4 cup (30 g) dry each day.  · Meat, poultry, crab, lobsters, oysters, and shrimp are moderate in purines. Limit to 4 to 6 ounces of these foods per day.  · Lunch meats, especially high-fat versions  · Asparagus, spinach, cauliflower, and mushrooms. Limit these vegetables to 1/2 cup per day (75 g).  · Dried beans, peas, and lentils. Limit these to 1 cup (240 g) cooked each day.  · Most kinds of alcohol  Where can I learn more?   [employer]  http://familydoctor.org/familydoctor/en/prevention-wellness/food-nutrition/weight-loss/low-purine-diet.html   Last Reviewed Date   2021-09-17  Consumer Information Use and Disclaimer   This information is not specific medical advice and does not replace information you receive from your health care provider. This is only a brief summary of general information. It does NOT include all information about conditions, illnesses, injuries, tests, procedures, treatments, therapies, discharge instructions or life-style choices that may apply to you. You must talk with your health care provider for complete information about your health and treatment options. This information should not be used to decide whether or not to accept your health care providers advice, instructions or recommendations. Only your health care provider has the knowledge and training to provide advice that is right for you.  Copyright   Copyright © 2021 [employer], [employer] and its affiliates and/or licensors. All rights reserved.

## 2022-01-19 NOTE — PROGRESS NOTES
Subjective:       Patient ID: John Barthelemy is a 46 y.o. male.    Chief Complaint: Annual Exam    HPI Patient is a 46-year-old male presenting today for reestablishment of care.  It has been little over 3 years since he was last in.  Patient has history of gout.  He states that he has been having ongoing problems with every 3-4 months having a gout attack.  He notes it is primarily in his right great toe he also has some discomfort in his right ankle at times.  Otherwise he has had no significant issues.  He takes colchicine as necessary.  He is not on any suppressive therapy.    We discussed health maintenance issues.  He defers on flu shot today.  He has also deferred on tetanus.  He is interested in getting colon cancer screening set up.    Review of Systems   Constitutional: Negative for fever and unexpected weight change.   HENT: Negative for hearing loss, postnasal drip and rhinorrhea.    Eyes: Negative for pain and visual disturbance.   Respiratory: Negative for cough, shortness of breath and wheezing.    Cardiovascular: Negative for chest pain and palpitations.   Gastrointestinal: Negative for constipation, diarrhea, nausea and vomiting.   Genitourinary: Negative for dysuria and hematuria.   Musculoskeletal: Positive for arthralgias. Negative for back pain, myalgias and neck stiffness.   Skin: Negative for pallor and rash.   Neurological: Negative for seizures, syncope and headaches.   Hematological: Negative for adenopathy.   Psychiatric/Behavioral: Negative for dysphoric mood. The patient is not nervous/anxious.        Objective:   /80   Pulse (!) 53   Temp 97.5 °F (36.4 °C)   Wt 118.3 kg (260 lb 12.9 oz)   SpO2 95%   BMI 38.51 kg/m²      Physical Exam  Vitals reviewed.   Constitutional:       General: He is not in acute distress.     Appearance: He is well-developed.   HENT:      Head: Normocephalic and atraumatic.      Right Ear: Tympanic membrane and ear canal normal.      Left Ear:  Tympanic membrane and ear canal normal.   Eyes:      Pupils: Pupils are equal, round, and reactive to light.   Neck:      Thyroid: No thyromegaly.      Vascular: No JVD.   Cardiovascular:      Rate and Rhythm: Normal rate and regular rhythm.      Heart sounds: Normal heart sounds. No murmur heard.  No friction rub. No gallop.    Pulmonary:      Effort: Pulmonary effort is normal.      Breath sounds: Normal breath sounds. No wheezing or rales.   Abdominal:      General: Bowel sounds are normal. There is no distension.      Palpations: Abdomen is soft.      Tenderness: There is no abdominal tenderness. There is no guarding or rebound.   Musculoskeletal:         General: Normal range of motion.      Cervical back: Normal range of motion and neck supple.   Lymphadenopathy:      Cervical: No cervical adenopathy.   Skin:     General: Skin is warm and dry.      Findings: No rash.   Neurological:      General: No focal deficit present.      Mental Status: He is alert and oriented to person, place, and time.      Cranial Nerves: No cranial nerve deficit.      Deep Tendon Reflexes: Reflexes are normal and symmetric.   Psychiatric:         Mood and Affect: Mood normal.         Judgment: Judgment normal.             Assessment:       1. Routine general medical examination at health care facility    2. Idiopathic gout involving toe of right foot, unspecified chronicity    3. Colon cancer screening        Plan:   No problem-specific Assessment & Plan notes found for this encounter.    Routine general medical examination at health care facility  Comments:  Focus on good health habits, low fat diet, regular exercise, seatbelt use, sunscreen use, weight loss  Orders:  -     Lipid Panel; Future; Expected date: 01/19/2022  -     Uric Acid; Future; Expected date: 01/19/2022  -     Comprehensive Metabolic Panel; Future; Expected date: 01/19/2022    Idiopathic gout involving toe of right foot, unspecified chronicity  Comments:  Follow  gout diet,  Start allopurinol 300mg daily.  Orders:  -     allopurinoL (ZYLOPRIM) 300 MG tablet; Take 1 tablet (300 mg total) by mouth once daily.  Dispense: 90 tablet; Refill: 3    Colon cancer screening  -     Case Request Endoscopy: COLONOSCOPY          Follow up in about 1 year (around 1/19/2023).

## 2022-01-20 ENCOUNTER — PATIENT MESSAGE (OUTPATIENT)
Dept: INTERNAL MEDICINE | Facility: CLINIC | Age: 47
End: 2022-01-20
Payer: COMMERCIAL

## 2022-01-20 DIAGNOSIS — M10.071 IDIOPATHIC GOUT INVOLVING TOE OF RIGHT FOOT, UNSPECIFIED CHRONICITY: Primary | ICD-10-CM

## 2022-01-20 DIAGNOSIS — E78.2 MIXED HYPERLIPIDEMIA: ICD-10-CM

## 2022-01-20 RX ORDER — ATORVASTATIN CALCIUM 20 MG/1
20 TABLET, FILM COATED ORAL DAILY
Qty: 90 TABLET | Refills: 4 | Status: SHIPPED | OUTPATIENT
Start: 2022-01-20 | End: 2023-06-26

## 2022-04-03 ENCOUNTER — TELEPHONE (OUTPATIENT)
Dept: ADMINISTRATIVE | Facility: HOSPITAL | Age: 47
End: 2022-04-03
Payer: COMMERCIAL

## 2022-04-03 DIAGNOSIS — Z12.11 COLON CANCER SCREENING: Primary | ICD-10-CM

## 2022-04-06 ENCOUNTER — HOSPITAL ENCOUNTER (OUTPATIENT)
Dept: PREADMISSION TESTING | Facility: HOSPITAL | Age: 47
Discharge: HOME | End: 2022-04-06

## 2023-04-26 ENCOUNTER — PATIENT MESSAGE (OUTPATIENT)
Dept: INTERNAL MEDICINE | Facility: CLINIC | Age: 48
End: 2023-04-26
Payer: COMMERCIAL

## 2023-06-26 ENCOUNTER — OFFICE VISIT (OUTPATIENT)
Dept: INTERNAL MEDICINE | Facility: CLINIC | Age: 48
End: 2023-06-26
Payer: COMMERCIAL

## 2023-06-26 VITALS
TEMPERATURE: 98 F | BODY MASS INDEX: 38.72 KG/M2 | DIASTOLIC BLOOD PRESSURE: 70 MMHG | HEART RATE: 69 BPM | SYSTOLIC BLOOD PRESSURE: 126 MMHG | OXYGEN SATURATION: 96 % | HEIGHT: 69 IN | WEIGHT: 261.44 LBS

## 2023-06-26 DIAGNOSIS — Z12.5 SCREENING FOR MALIGNANT NEOPLASM OF PROSTATE: ICD-10-CM

## 2023-06-26 DIAGNOSIS — Z13.1 SCREENING FOR DIABETES MELLITUS (DM): ICD-10-CM

## 2023-06-26 DIAGNOSIS — Z23 ENCOUNTER FOR IMMUNIZATION: ICD-10-CM

## 2023-06-26 DIAGNOSIS — E78.2 MIXED HYPERLIPIDEMIA: ICD-10-CM

## 2023-06-26 DIAGNOSIS — Z12.11 ENCOUNTER FOR SCREENING COLONOSCOPY: ICD-10-CM

## 2023-06-26 DIAGNOSIS — E66.01 SEVERE OBESITY (BMI 35.0-39.9) WITH COMORBIDITY: ICD-10-CM

## 2023-06-26 DIAGNOSIS — Z00.00 ROUTINE GENERAL MEDICAL EXAMINATION AT HEALTH CARE FACILITY: Primary | ICD-10-CM

## 2023-06-26 DIAGNOSIS — M10.071 IDIOPATHIC GOUT INVOLVING TOE OF RIGHT FOOT, UNSPECIFIED CHRONICITY: ICD-10-CM

## 2023-06-26 DIAGNOSIS — M79.671 PAIN IN RIGHT FOOT: ICD-10-CM

## 2023-06-26 PROCEDURE — 3074F PR MOST RECENT SYSTOLIC BLOOD PRESSURE < 130 MM HG: ICD-10-PCS | Mod: CPTII,S$GLB,, | Performed by: NURSE PRACTITIONER

## 2023-06-26 PROCEDURE — 90715 TDAP VACCINE 7 YRS/> IM: CPT | Mod: S$GLB,,, | Performed by: NURSE PRACTITIONER

## 2023-06-26 PROCEDURE — 99999 PR PBB SHADOW E&M-EST. PATIENT-LVL V: ICD-10-PCS | Mod: PBBFAC,,, | Performed by: NURSE PRACTITIONER

## 2023-06-26 PROCEDURE — 99999 PR PBB SHADOW E&M-EST. PATIENT-LVL V: CPT | Mod: PBBFAC,,, | Performed by: NURSE PRACTITIONER

## 2023-06-26 PROCEDURE — 3074F SYST BP LT 130 MM HG: CPT | Mod: CPTII,S$GLB,, | Performed by: NURSE PRACTITIONER

## 2023-06-26 PROCEDURE — 3078F PR MOST RECENT DIASTOLIC BLOOD PRESSURE < 80 MM HG: ICD-10-PCS | Mod: CPTII,S$GLB,, | Performed by: NURSE PRACTITIONER

## 2023-06-26 PROCEDURE — 1159F MED LIST DOCD IN RCRD: CPT | Mod: CPTII,S$GLB,, | Performed by: NURSE PRACTITIONER

## 2023-06-26 PROCEDURE — 99396 PREV VISIT EST AGE 40-64: CPT | Mod: 25,S$GLB,, | Performed by: NURSE PRACTITIONER

## 2023-06-26 PROCEDURE — 3008F PR BODY MASS INDEX (BMI) DOCUMENTED: ICD-10-PCS | Mod: CPTII,S$GLB,, | Performed by: NURSE PRACTITIONER

## 2023-06-26 PROCEDURE — 3078F DIAST BP <80 MM HG: CPT | Mod: CPTII,S$GLB,, | Performed by: NURSE PRACTITIONER

## 2023-06-26 PROCEDURE — 1159F PR MEDICATION LIST DOCUMENTED IN MEDICAL RECORD: ICD-10-PCS | Mod: CPTII,S$GLB,, | Performed by: NURSE PRACTITIONER

## 2023-06-26 PROCEDURE — 90715 TDAP VACCINE GREATER THAN OR EQUAL TO 7YO IM: ICD-10-PCS | Mod: S$GLB,,, | Performed by: NURSE PRACTITIONER

## 2023-06-26 PROCEDURE — 99396 PR PREVENTIVE VISIT,EST,40-64: ICD-10-PCS | Mod: 25,S$GLB,, | Performed by: NURSE PRACTITIONER

## 2023-06-26 PROCEDURE — 90471 IMMUNIZATION ADMIN: CPT | Mod: S$GLB,,, | Performed by: NURSE PRACTITIONER

## 2023-06-26 PROCEDURE — 3008F BODY MASS INDEX DOCD: CPT | Mod: CPTII,S$GLB,, | Performed by: NURSE PRACTITIONER

## 2023-06-26 PROCEDURE — 90471 TDAP VACCINE GREATER THAN OR EQUAL TO 7YO IM: ICD-10-PCS | Mod: S$GLB,,, | Performed by: NURSE PRACTITIONER

## 2023-06-26 RX ORDER — INDOMETHACIN 50 MG/1
50 CAPSULE ORAL 3 TIMES DAILY PRN
Qty: 30 CAPSULE | Refills: 1 | Status: SHIPPED | OUTPATIENT
Start: 2023-06-26 | End: 2023-10-16 | Stop reason: SDUPTHER

## 2023-06-26 NOTE — PROGRESS NOTES
"Subjective:       Patient ID: John Barthelemy is a 48 y.o. male.    Chief Complaint: Annual Exam (Wants a complete physical, )    Pt presents to clinic for annual exam  Overall he has been doing okay  He has stopped his atorvastatin many months ago- no desire to take the medication  We discussed his last FLP and risk associated with this, will update labs and go from there   He has changed his diet, stopped the allopurinol  Gout attacks have decreased drastically- about 1 every 2 months for which he uses indomethacin prn  Due for updated labs, c-scope  Sleeping okay at night  Bowels moving normal     He is having some pain in his right ankle  Mostly when he overuses the ankle  Feels a 'knot' on the back of the foot  Pain with weight bearing       /70   Pulse 69   Temp 98 °F (36.7 °C) (Tympanic)   Ht 5' 9" (1.753 m)   Wt 118.6 kg (261 lb 7.5 oz)   SpO2 96%   BMI 38.61 kg/m²     Review of Systems   Constitutional:  Negative for appetite change, chills, diaphoresis, fever and unexpected weight change.   HENT:  Negative for congestion, ear pain, nosebleeds, postnasal drip, rhinorrhea, sinus pressure, sneezing, sore throat and trouble swallowing.    Eyes:  Negative for photophobia, pain and visual disturbance.   Respiratory:  Negative for apnea, cough, choking, chest tightness, shortness of breath and wheezing.    Cardiovascular:  Negative for chest pain, palpitations and leg swelling.   Gastrointestinal:  Negative for abdominal pain, blood in stool, constipation, diarrhea, nausea and vomiting.   Genitourinary:  Negative for decreased urine volume, difficulty urinating, dysuria, hematuria and urgency.   Musculoskeletal:  Negative for arthralgias, gait problem, joint swelling and myalgias.   Skin:  Negative for rash.   Neurological:  Negative for dizziness, tremors, seizures, syncope, weakness, light-headedness, numbness and headaches.   Psychiatric/Behavioral:  Negative for agitation, confusion, decreased " concentration, hallucinations and sleep disturbance. The patient is not nervous/anxious.      Objective:      Physical Exam  Vitals reviewed.   Constitutional:       General: He is not in acute distress.     Appearance: Normal appearance. He is normal weight.   HENT:      Head: Normocephalic.      Right Ear: Tympanic membrane normal.      Left Ear: Tympanic membrane normal.      Nose: Nose normal.   Eyes:      Conjunctiva/sclera: Conjunctivae normal.      Pupils: Pupils are equal, round, and reactive to light.   Cardiovascular:      Rate and Rhythm: Normal rate.      Pulses: Normal pulses.   Pulmonary:      Effort: Pulmonary effort is normal.   Abdominal:      General: Abdomen is flat.      Palpations: Abdomen is soft.   Musculoskeletal:         General: Normal range of motion.      Cervical back: Normal range of motion.   Skin:     General: Skin is warm.      Capillary Refill: Capillary refill takes less than 2 seconds.   Neurological:      Mental Status: He is alert and oriented to person, place, and time. Mental status is at baseline.   Psychiatric:         Mood and Affect: Mood normal.       Assessment:       1. Routine general medical examination at health care facility    2. Mixed hyperlipidemia    3. Screening for malignant neoplasm of prostate    4. Encounter for screening colonoscopy    5. Screening for diabetes mellitus (DM)    6. Encounter for immunization    7. Idiopathic gout involving toe of right foot, unspecified chronicity    8. Pain in right foot    9. Severe obesity (BMI 35.0-39.9) with comorbidity        Plan:       Jens was seen today for annual exam.    Diagnoses and all orders for this visit:    Routine general medical examination at health care facility  -     CBC Auto Differential; Future  -     Comprehensive Metabolic Panel; Future  -     Hepatitis C Antibody; Future  -     HIV 1/2 Ag/Ab (4th Gen); Future  - HM for age discussed. Encouraged healthy eating habits, exercise    Mixed  hyperlipidemia  -     Lipid Panel; Future  - Chronic, will update FLP, assess risk and restart meds if necessary    Screening for malignant neoplasm of prostate  -     PSA, Screening; Future    Encounter for screening colonoscopy  -     Ambulatory referral/consult to Endo Procedure ; Future    Screening for diabetes mellitus (DM)  -     Hemoglobin A1C; Future    Encounter for immunization  -     (In Office Administered) Tdap Vaccine    Idiopathic gout involving toe of right foot, unspecified chronicity  -     indomethacin (INDOCIN) 50 MG capsule; Take 1 capsule (50 mg total) by mouth 3 (three) times daily as needed (pain, gout).  - Chronic, stable, controlled with diet.     Pain in right foot  -     X-Ray Calcaneus 2 View Right; Future  - Will xray, trail of nsaids for 5 days. If no improvement, ortho/podiatry     Severe obesity (BMI 35.0-39.9) with comorbidity      Vitals reviewed and stable. BP within normal range at 126/70.       Patient has been making dietary changes. Patient is to continue eating a well balanced diet.      He is encouraged to engage in exercise outside of his day to day activities.      Patient advised to have colon cancer screening. Order placed for colonoscopy.      Questions and concerns addressed.      Follow up in 6 months or PRN.

## 2023-06-27 ENCOUNTER — HOSPITAL ENCOUNTER (OUTPATIENT)
Dept: RADIOLOGY | Facility: HOSPITAL | Age: 48
Discharge: HOME | End: 2023-06-27
Attending: NURSE PRACTITIONER
Payer: COMMERCIAL

## 2023-06-27 DIAGNOSIS — M79.671 PAIN IN RIGHT FOOT: ICD-10-CM

## 2023-06-27 DIAGNOSIS — M79.671 PAIN IN RIGHT FOOT: Primary | ICD-10-CM

## 2023-06-27 PROCEDURE — 73650 X-RAY EXAM OF HEEL: CPT | Mod: TC,FY,PO,RT

## 2023-06-27 PROCEDURE — 73650 X-RAY EXAM OF HEEL: CPT | Mod: 26,RT,, | Performed by: RADIOLOGY

## 2023-06-27 PROCEDURE — 73650 XR CALCANEUS 2 VIEW RIGHT: ICD-10-PCS | Mod: 26,RT,, | Performed by: RADIOLOGY

## 2023-06-28 DIAGNOSIS — E78.2 MIXED HYPERLIPIDEMIA: ICD-10-CM

## 2023-06-28 RX ORDER — ATORVASTATIN CALCIUM 20 MG/1
20 TABLET, FILM COATED ORAL DAILY
Qty: 90 TABLET | Refills: 4 | Status: SHIPPED | OUTPATIENT
Start: 2023-06-28 | End: 2023-09-13 | Stop reason: SDUPTHER

## 2023-07-19 ENCOUNTER — TELEPHONE (OUTPATIENT)
Dept: PODIATRY | Facility: CLINIC | Age: 48
End: 2023-07-19
Payer: COMMERCIAL

## 2023-08-03 ENCOUNTER — HOSPITAL ENCOUNTER (OUTPATIENT)
Dept: PREADMISSION TESTING | Facility: HOSPITAL | Age: 48
Discharge: HOME | End: 2023-08-03
Attending: INTERNAL MEDICINE
Payer: COMMERCIAL

## 2023-08-03 DIAGNOSIS — Z12.11 ENCOUNTER FOR SCREENING COLONOSCOPY: Primary | ICD-10-CM

## 2023-08-03 RX ORDER — SODIUM, POTASSIUM,MAG SULFATES 17.5-3.13G
1 SOLUTION, RECONSTITUTED, ORAL ORAL DAILY
Qty: 1 KIT | Refills: 0 | Status: SHIPPED | OUTPATIENT
Start: 2023-08-03 | End: 2023-08-05

## 2023-08-04 ENCOUNTER — ANESTHESIA EVENT (OUTPATIENT)
Dept: ENDOSCOPY | Facility: HOSPITAL | Age: 48
End: 2023-08-04
Payer: COMMERCIAL

## 2023-08-04 ENCOUNTER — OFFICE VISIT (OUTPATIENT)
Dept: PODIATRY | Facility: CLINIC | Age: 48
End: 2023-08-04
Payer: COMMERCIAL

## 2023-08-04 ENCOUNTER — HOSPITAL ENCOUNTER (OUTPATIENT)
Dept: RADIOLOGY | Facility: HOSPITAL | Age: 48
Discharge: HOME | End: 2023-08-04
Attending: PODIATRIST
Payer: COMMERCIAL

## 2023-08-04 VITALS — HEIGHT: 69 IN | WEIGHT: 261.44 LBS | BODY MASS INDEX: 38.72 KG/M2

## 2023-08-04 DIAGNOSIS — M1A.09X0 IDIOPATHIC CHRONIC GOUT OF MULTIPLE SITES WITHOUT TOPHUS: ICD-10-CM

## 2023-08-04 DIAGNOSIS — B35.3 TINEA PEDIS OF RIGHT FOOT: ICD-10-CM

## 2023-08-04 DIAGNOSIS — M25.571 CHRONIC PAIN OF RIGHT ANKLE: Primary | ICD-10-CM

## 2023-08-04 DIAGNOSIS — M25.571 RIGHT ANKLE PAIN, UNSPECIFIED CHRONICITY: ICD-10-CM

## 2023-08-04 DIAGNOSIS — M25.571 RIGHT ANKLE PAIN, UNSPECIFIED CHRONICITY: Primary | ICD-10-CM

## 2023-08-04 DIAGNOSIS — G89.29 CHRONIC PAIN OF RIGHT ANKLE: Primary | ICD-10-CM

## 2023-08-04 PROCEDURE — 1159F MED LIST DOCD IN RCRD: CPT | Mod: CPTII,S$GLB,, | Performed by: PODIATRIST

## 2023-08-04 PROCEDURE — 73610 X-RAY EXAM OF ANKLE: CPT | Mod: 26,RT,, | Performed by: RADIOLOGY

## 2023-08-04 PROCEDURE — 99999 PR PBB SHADOW E&M-EST. PATIENT-LVL III: ICD-10-PCS | Mod: PBBFAC,,, | Performed by: PODIATRIST

## 2023-08-04 PROCEDURE — 99204 OFFICE O/P NEW MOD 45 MIN: CPT | Mod: S$GLB,,, | Performed by: PODIATRIST

## 2023-08-04 PROCEDURE — 3008F PR BODY MASS INDEX (BMI) DOCUMENTED: ICD-10-PCS | Mod: CPTII,S$GLB,, | Performed by: PODIATRIST

## 2023-08-04 PROCEDURE — 73610 X-RAY EXAM OF ANKLE: CPT | Mod: TC,FY,PO,RT

## 2023-08-04 PROCEDURE — 3008F BODY MASS INDEX DOCD: CPT | Mod: CPTII,S$GLB,, | Performed by: PODIATRIST

## 2023-08-04 PROCEDURE — 99204 PR OFFICE/OUTPT VISIT, NEW, LEVL IV, 45-59 MIN: ICD-10-PCS | Mod: S$GLB,,, | Performed by: PODIATRIST

## 2023-08-04 PROCEDURE — 3044F PR MOST RECENT HEMOGLOBIN A1C LEVEL <7.0%: ICD-10-PCS | Mod: CPTII,S$GLB,, | Performed by: PODIATRIST

## 2023-08-04 PROCEDURE — 73610 XR ANKLE COMPLETE 3 VIEW RIGHT: ICD-10-PCS | Mod: 26,RT,, | Performed by: RADIOLOGY

## 2023-08-04 PROCEDURE — 3044F HG A1C LEVEL LT 7.0%: CPT | Mod: CPTII,S$GLB,, | Performed by: PODIATRIST

## 2023-08-04 PROCEDURE — 1159F PR MEDICATION LIST DOCUMENTED IN MEDICAL RECORD: ICD-10-PCS | Mod: CPTII,S$GLB,, | Performed by: PODIATRIST

## 2023-08-04 PROCEDURE — 99999 PR PBB SHADOW E&M-EST. PATIENT-LVL III: CPT | Mod: PBBFAC,,, | Performed by: PODIATRIST

## 2023-08-04 RX ORDER — CICLOPIROX 7.7 MG/G
GEL TOPICAL 2 TIMES DAILY
Qty: 30 G | Refills: 0 | Status: SHIPPED | OUTPATIENT
Start: 2023-08-04 | End: 2023-09-13 | Stop reason: SDUPTHER

## 2023-08-04 NOTE — PROGRESS NOTES
PODIATRIC MEDICINE AND SURGERY  8/4/2023    PCP: Dr. Fuchs, Orlando PAREKH MD    CHIEF COMPLAINT   Chief Complaint   Patient presents with    Foot Pain     C/o  right ankle .pt states he wants to make sure nothing is damaged and gets stiff,states several years, no injury within the years, 0 pain, stiffness, wearing tennis, last seen PCP 04/26/23 Dr. Fuchs        HPI:    John Barthelemy is a 48 y.o. male who has a past medical history of Chronic left shoulder pain (7/17/2017) and Gout.   Jens presents to clinic today complaining of chronic right ankle pain. Pt states he does have history of gout. He does not have current pain to sites. His main complaint  is ankle stiffness after periods of rest. He denies any current pains.      Patient denies other pedal complaints at this time.     PMH  Past Medical History:   Diagnosis Date    Chronic left shoulder pain 7/17/2017    Gout        PROBLEM LIST  Patient Active Problem List    Diagnosis Date Noted    Severe obesity (BMI 35.0-39.9) with comorbidity 06/26/2023    Gout        MEDS  Current Outpatient Medications on File Prior to Visit   Medication Sig Dispense Refill    atorvastatin (LIPITOR) 20 MG tablet Take 1 tablet (20 mg total) by mouth once daily. 90 tablet 4    indomethacin (INDOCIN) 50 MG capsule Take 1 capsule (50 mg total) by mouth 3 (three) times daily as needed (pain, gout). 30 capsule 1    sodium,potassium,mag sulfates (SUPREP BOWEL PREP KIT) 17.5-3.13-1.6 gram SolR Take 177 mLs by mouth once daily. for 2 days 1 kit 0    colchicine (COLCRYS) 0.6 mg tablet Take 1 tablet (0.6 mg total) by mouth 2 (two) times daily. (Patient not taking: Reported on 6/26/2023) 60 tablet 11     No current facility-administered medications on file prior to visit.       Medication List with Changes/Refills   New Medications    CICLOPIROX 0.77 % GEL    Apply topically 2 (two) times daily. To toe webspaces   Current Medications    ATORVASTATIN (LIPITOR) 20 MG TABLET    Take 1 tablet  "(20 mg total) by mouth once daily.    COLCHICINE (COLCRYS) 0.6 MG TABLET    Take 1 tablet (0.6 mg total) by mouth 2 (two) times daily.    INDOMETHACIN (INDOCIN) 50 MG CAPSULE    Take 1 capsule (50 mg total) by mouth 3 (three) times daily as needed (pain, gout).    SODIUM,POTASSIUM,MAG SULFATES (SUPREP BOWEL PREP KIT) 17.5-3.13-1.6 GRAM SOLR    Take 177 mLs by mouth once daily. for 2 days       PSH   History reviewed. No pertinent surgical history.     ALL  Review of patient's allergies indicates:   Allergen Reactions    Betamethasone Other (See Comments)     Hiccups       SOC     Social History     Tobacco Use    Smoking status: Never    Smokeless tobacco: Never   Substance Use Topics    Alcohol use: Yes     Alcohol/week: 6.0 standard drinks of alcohol     Types: 6 Cans of beer per week     Comment: socially     Drug use: No         FAMILY HX    Family History   Problem Relation Age of Onset    No Known Problems Mother     Heart disease Father 39        Bypass    No Known Problems Sister     Cancer Paternal Grandmother             REVIEW OF SYSTEMS  General: This patient is well-developed, well-nourished and appears stated age, well-oriented to person, place and time, and cooperative and pleasant on today's visit   Constitutional: Negative for chills and fever.   Respiratory: Negative for shortness of breath.    Cardiovascular: Negative for chest pain, palpitations, orthopnea  Gastrointestinal: Negative for diarrhea, nausea and vomiting.   Musculoskeletal: Positive for above noted in HPI  Skin: positive for skin and/or nail changes   Neurological: negative  for tingling and sensory changes  Peripheral Vascular: no claudication or cyanosis  Psychiatric/Behavioral: Negative for altered mental status     PHYSICAL EXAM:      Vitals:    08/04/23 0844   Weight: 118.6 kg (261 lb 7.5 oz)   Height: 5' 9" (1.753 m)   PainSc: 0-No pain         LOWER EXTREMITY PHYSICAL EXAM  VASCULAR  Dorsalis pedis and posterior tibial pulses " palpable 2/4 bilaterally. Capillary refill time immediate to the toes. Feet are warm to the touch. Skin temperature warm to warm from proximally to distally There are no varicosities, telangiectasias noted to bilateral foot and ankle regions. There are no ecchymoses noted to bilateral foot and ankle regions. There is no gross lower extremity edema.    DERMATOLOGIC  Skin moist with healthy texture and turgor.There are no open ulcerations, lacerations, or fissures to bilateral foot and ankle regions. There are no signs of infection as there is no erythema, no proximal-extending lymphangiitis, no fluctuance, or crepitus noted on palpation to bilateral foot and ankle regions. There is interdigital maceration with scales.    There are hyperkeratotic lesions noted to feet. Nails are well-trimmed.    NEUROLOGIC  Epicritic sensation is intact as the patient is able to sense light touch to bilateral foot and ankle regions. Achilles and patellar deep tendon reflexes intact. Babinski reflex absent    ORTHOPEDIC/BIOMECHANICAL  No symptomatic structural abnormalities noted. Muscle strength AT/EHL/EDL/PT: 5/5; Achilles/Gastroc/Soleus: 5/5; PB/PL: 5/5 Muscle tone is normal. Ankle joint ROM non painful with DF/PF, non-crepitus;    IMAGING   Reviewed by me and I agree with radiologist findings, 3 views of foot/ankle, reveal:  No results found for this or any previous visit.          No results found for this or any previous visit.      No results found for this or any previous visit.       No results found for this or any previous visit.          ASSESSMENT     Encounter Diagnoses   Name Primary?    Chronic pain of right ankle Yes    Idiopathic chronic gout of multiple sites without tophus     Tinea pedis of right foot          PLAN  Patient was educated about clinical and imaging findings, and verbalizes understanding of above.     Diagnoses and all orders for this visit:  Chronic pain of right ankle    Idiopathic chronic gout of  multiple sites without tophus    Tinea pedis of right foot    Other orders  -     ciclopirox 0.77 % Gel; Apply topically 2 (two) times daily. To toe webspaces  Dispense: 30 g; Refill: 0      We reviewed xray findings and discussed treatment recommendations, vitamin supplementation, and shoe recommendations provided  Ankle ROM/ Foot exercises    For the athletes foot, patient was prescribed antifungal to apply between the toes and to the bottoms of feet twice a day. In addition, recommendations were made to spray and disinfect shoes with Lysol and to alternate shoes. Also, for excessive sweating patient was instructed to use Arid Extra Dry spray on the bottom of the feet with Tinactin powder in the digital interspaces.        Disclaimer:  This note may have been prepared using voice recognition software, it may have not been extensively proofed, as such there could be errors within the text such as sound alike errors.         Future Appointments   Date Time Provider Department Center   12/28/2023  3:40 PM Orlando Fuchs MD [facility] [place]       Report Electronically Signed By:     Estrella Pedroza DPM   Podiatry  [facility]  8/4/2023

## 2023-08-04 NOTE — ANESTHESIA PREPROCEDURE EVALUATION
08/04/2023  John Barthelemy is a 48 y.o., male.  Past Medical History:   Diagnosis Date    Chronic left shoulder pain 7/17/2017    Gout      No past surgical history on file.      Pre-op Assessment    I have reviewed the Patient Summary Reports.     I have reviewed the Nursing Notes. I have reviewed the NPO Status.   I have reviewed the Medications.     Review of Systems  Anesthesia Hx:  No problems with previous Anesthesia  History of prior surgery of interest to airway management or planning:  Denies Personal Hx of Anesthesia complications.   Social:  Non-Smoker, Social Alcohol Use    Hematology/Oncology:  Hematology Normal   Oncology Normal     EENT/Dental:EENT/Dental Normal   Pulmonary:  Pulmonary Normal    Hepatic/GI:   Bowel Prep.    Musculoskeletal:   gout   Neurological:  Neurology Normal    Endocrine:  Obesity / BMI > 30      Physical Exam  General: Well nourished, Cooperative, Alert and Oriented    Airway:  Mallampati: II   Mouth Opening: Normal  TM Distance: Normal  Tongue: Normal  Neck ROM: Normal ROM    Dental:  Intact    Chest/Lungs:  Normal Respiratory Rate    Heart:  Rhythm: Regular Rhythm        Anesthesia Plan  Type of Anesthesia, risks & benefits discussed:    Anesthesia Type: Gen Natural Airway  Intra-op Monitoring Plan: Standard ASA Monitors  Post Op Pain Control Plan: multimodal analgesia  Induction:  IV  Informed Consent: Informed consent signed with the Patient and all parties understand the risks and agree with anesthesia plan.  All questions answered. Patient consented to blood products? No  ASA Score: 2  Day of Surgery Review of History & Physical: H&P Update referred to the surgeon/provider.    Ready For Surgery From Anesthesia Perspective.     .

## 2023-08-08 ENCOUNTER — ANESTHESIA (OUTPATIENT)
Dept: ENDOSCOPY | Facility: HOSPITAL | Age: 48
End: 2023-08-08
Payer: COMMERCIAL

## 2023-08-23 DIAGNOSIS — M25.561 RIGHT KNEE PAIN, UNSPECIFIED CHRONICITY: Primary | ICD-10-CM

## 2023-08-24 ENCOUNTER — OFFICE VISIT (OUTPATIENT)
Dept: SPORTS MEDICINE | Facility: CLINIC | Age: 48
End: 2023-08-24
Payer: COMMERCIAL

## 2023-08-24 ENCOUNTER — HOSPITAL ENCOUNTER (OUTPATIENT)
Dept: RADIOLOGY | Facility: HOSPITAL | Age: 48
Discharge: HOME | End: 2023-08-24
Attending: ORTHOPAEDIC SURGERY
Payer: COMMERCIAL

## 2023-08-24 VITALS — WEIGHT: 250 LBS | HEIGHT: 69 IN | BODY MASS INDEX: 37.03 KG/M2

## 2023-08-24 DIAGNOSIS — M25.561 RIGHT KNEE PAIN, UNSPECIFIED CHRONICITY: ICD-10-CM

## 2023-08-24 DIAGNOSIS — M1A.0610 CHRONIC GOUT OF RIGHT KNEE, UNSPECIFIED CAUSE: Primary | ICD-10-CM

## 2023-08-24 PROCEDURE — 73562 X-RAY EXAM OF KNEE 3: CPT | Mod: TC,LT

## 2023-08-24 PROCEDURE — 99204 PR OFFICE/OUTPT VISIT, NEW, LEVL IV, 45-59 MIN: ICD-10-PCS | Mod: S$GLB,,, | Performed by: ORTHOPAEDIC SURGERY

## 2023-08-24 PROCEDURE — 1159F MED LIST DOCD IN RCRD: CPT | Mod: CPTII,S$GLB,, | Performed by: ORTHOPAEDIC SURGERY

## 2023-08-24 PROCEDURE — 3008F PR BODY MASS INDEX (BMI) DOCUMENTED: ICD-10-PCS | Mod: CPTII,S$GLB,, | Performed by: ORTHOPAEDIC SURGERY

## 2023-08-24 PROCEDURE — 3008F BODY MASS INDEX DOCD: CPT | Mod: CPTII,S$GLB,, | Performed by: ORTHOPAEDIC SURGERY

## 2023-08-24 PROCEDURE — 99999 PR PBB SHADOW E&M-EST. PATIENT-LVL III: ICD-10-PCS | Mod: PBBFAC,,, | Performed by: ORTHOPAEDIC SURGERY

## 2023-08-24 PROCEDURE — 73562 X-RAY EXAM OF KNEE 3: CPT | Mod: 26,LT,, | Performed by: RADIOLOGY

## 2023-08-24 PROCEDURE — 73564 XR KNEE ORTHO RIGHT WITH FLEXION: ICD-10-PCS | Mod: 26,RT,, | Performed by: RADIOLOGY

## 2023-08-24 PROCEDURE — 3044F PR MOST RECENT HEMOGLOBIN A1C LEVEL <7.0%: ICD-10-PCS | Mod: CPTII,S$GLB,, | Performed by: ORTHOPAEDIC SURGERY

## 2023-08-24 PROCEDURE — 97760 ORTHOTIC MGMT&TRAING 1ST ENC: CPT | Mod: S$GLB,,, | Performed by: ORTHOPAEDIC SURGERY

## 2023-08-24 PROCEDURE — 99204 OFFICE O/P NEW MOD 45 MIN: CPT | Mod: S$GLB,,, | Performed by: ORTHOPAEDIC SURGERY

## 2023-08-24 PROCEDURE — 3044F HG A1C LEVEL LT 7.0%: CPT | Mod: CPTII,S$GLB,, | Performed by: ORTHOPAEDIC SURGERY

## 2023-08-24 PROCEDURE — 99999 PR PBB SHADOW E&M-EST. PATIENT-LVL III: CPT | Mod: PBBFAC,,, | Performed by: ORTHOPAEDIC SURGERY

## 2023-08-24 PROCEDURE — 97760 PR ORTHOTIC MGMT&TRAINJ INITIAL ENC EA 15 MINS: ICD-10-PCS | Mod: S$GLB,,, | Performed by: ORTHOPAEDIC SURGERY

## 2023-08-24 PROCEDURE — 73564 X-RAY EXAM KNEE 4 OR MORE: CPT | Mod: 26,RT,, | Performed by: RADIOLOGY

## 2023-08-24 PROCEDURE — 1159F PR MEDICATION LIST DOCUMENTED IN MEDICAL RECORD: ICD-10-PCS | Mod: CPTII,S$GLB,, | Performed by: ORTHOPAEDIC SURGERY

## 2023-08-24 PROCEDURE — 73562 XR KNEE ORTHO RIGHT WITH FLEXION: ICD-10-PCS | Mod: 26,LT,, | Performed by: RADIOLOGY

## 2023-08-24 NOTE — PROGRESS NOTES
Patient ID: John Barthelemy  YOB: 1975  MRN: [id number]    Chief Complaint: Pain of the Right Knee      Referred By: [facility]    History of Present Illness: John Barthelemy is a RHD 48 y.o. male   [facility] with a chief complaint of Pain of the Right Knee    Jens presents to the clinic today for R knee pain. He rates his pain as a 10 out of 10 today. He reports it began 3 weeks ago when he jogged in his office travelling down stairs. He reports he has had stiffness in his knee with prolonged sitting. His knee pain is reported as throbbing with a sensation that its going to give out on him. He reports he has tried ice, elevation, and NSAIDs for pain as needed. He reports he has used a compressive knee brace that helps the stabilization but not the pain. He reports no prior hx of pain in his knee. He states he has the most pain at night and is unable to sleep.  He states that he has a history of gout.    HPI    Past Medical History:   Past Medical History:   Diagnosis Date    Chronic left shoulder pain 7/17/2017    Gout      History reviewed. No pertinent surgical history.  Family History   Problem Relation Age of Onset    No Known Problems Mother     Heart disease Father 39        Bypass    No Known Problems Sister     Cancer Paternal Grandmother      Social History     Socioeconomic History    Marital status:     Number of children: 2   Occupational History    Occupation: Educator   Tobacco Use    Smoking status: Never    Smokeless tobacco: Never   Substance and Sexual Activity    Alcohol use: Yes     Alcohol/week: 6.0 standard drinks of alcohol     Types: 6 Cans of beer per week     Comment: socially     Drug use: No    Sexual activity: Yes     Partners: Female     Medication List with Changes/Refills   Current Medications    ATORVASTATIN (LIPITOR) 20 MG TABLET    Take 1 tablet (20 mg total) by mouth once daily.    CICLOPIROX 0.77 % GEL    Apply topically 2 (two)  times daily. To toe webspaces    COLCHICINE (COLCRYS) 0.6 MG TABLET    Take 1 tablet (0.6 mg total) by mouth 2 (two) times daily.    INDOMETHACIN (INDOCIN) 50 MG CAPSULE    Take 1 capsule (50 mg total) by mouth 3 (three) times daily as needed (pain, gout).     Review of patient's allergies indicates:   Allergen Reactions    Betamethasone Other (See Comments)     Hiccups     ROS    Physical Exam:   Body mass index is 36.92 kg/m².  There were no vitals filed for this visit.   GENERAL: Well appearing, appropriate for stated age, no acute distress.  CARDIOVASCULAR: Pulses regular by peripheral palpation.  PULMONARY: Respirations are even and non-labored.  NEURO: Awake, alert, and oriented x 3.  PSYCH: Mood & affect are appropriate.  HEENT: Head is normocephalic and atraumatic.  Ortho/SPM Exam      Right Knee:  Inspection: Moderate effusion  Palpation tenderness: Medial  joint line  Range of motion: 5-95 degrees.  Strength:  5/5 Extension    5/5 Flexion    5/5 Hip Abduction  Stability: stable ACL/Lachman      stable Posterior Drawer      stable MCL/Valgus Stress      stable LCL/Varus Stress  Meniscus Exam: Negative medial Maryann's         Negative lateral Maryann's  Patella Exam: Negative J-sign   Negative Patellar apprehension   Negative Patellar grind  N/V Exam:  Tibial:    Normal sensory (plantar foot)  Normal motor (FHL)    Sup Peroneal:   Normal sensory (dorsal foot)  Normal motor (Peroneals)            Deep Peroneal:   Normal sensory (1st web space)  Normal motor (EHL)    Sural:   Normal sensory (lateral foot)   Saphenous:   Normal sensory (medial lower leg)   Normal pedal pulses, warm and well perfused with capillary refill < 2 sec   Patella tendon reflex normal  Achilles tendon reflex normal      Imaging:    X-ray Knee Ortho Right with Flexion  Narrative: EXAMINATION:  XR KNEE ORTHO RIGHT WITH FLEXION    CLINICAL HISTORY:  Pain in right knee    TECHNIQUE:  AP standing as well as PA flexion standing and  Merchant views of both knees were performed.  A lateral view of the right knee is also performed.    COMPARISON:  Prior radiographs    FINDINGS:  No acute abnormality or significant change.  Joint spaces maintained without significant arthritic findings.  Impression: As    Electronically signed by: Flash Sanon MD  Date:    08/24/2023  Time:    12:50      Relevant imaging results reviewed and interpreted by me, discussed with the patient and / or family today.     Other Tests:         Patient Instructions   Assessment:  John Barthelemy is a 48 y.o. male   [facility] with a chief complaint of Pain of the Right Knee    Right knee effusion  Possible Gout episode  Possible OA flareup  Possible infxn, but lesslikely    Encounter Diagnosis   Name Primary?    Chronic gout of right knee, unspecified cause Yes      Plan:  Right knee corticosteroid injection discussed today - deferred due to being on an oral dose pack.  Knee sleeve given today  At least 10 minutes were spent sizing, fitting, and educating for durable medical equipment application today.  This service was performed under the direction of Dr. Brandan Tucker.  CPT 48181.  Lab work for gout on the way out  If not improving in 1 week, we will aspirate/analyze  Continue colchicine as prescribed by pcp.    Follow-up: 1 week with Garrett or sooner if there are any problems between now and then.    Leave Review:   Google: Leave Google Review  Healthgrades: Leave Healthgrades Review    After Hours Number: [phone]       Provider Note/Medical Decision Making:       I discussed worrisome and red flag signs and symptoms with the patient. The patient expressed understanding and agreed to alert me immediately or to go to the emergency room if they experience any of these.   Treatment plan was developed with input from the patient/family, and they expressed understanding and agreement with the plan. All questions were answered  today.          Brandan Tucker MD  Orthopaedic Surgery & Sports Medicine       Disclaimer: This note was prepared using a voice recognition system and is likely to have sound alike errors within the text.

## 2023-08-24 NOTE — PATIENT INSTRUCTIONS
Assessment:  John Barthelemy is a 48 y.o. male   [facility] with a chief complaint of Pain of the Right Knee    Right knee effusion  Possible Gout episode  Possible OA flareup  Possible infxn, but lesslikely    Encounter Diagnosis   Name Primary?    Chronic gout of right knee, unspecified cause Yes      Plan:  Right knee corticosteroid injection discussed today - deferred due to being on an oral dose pack.  Knee sleeve given today  At least 10 minutes were spent sizing, fitting, and educating for durable medical equipment application today.  This service was performed under the direction of Dr. Brandan Tucker.  CPT 89256.  Lab work for gout on the way out  If not improving in 1 week, we will aspirate/analyze  Continue colchicine as prescribed by pcp.    Follow-up: 1 week with Garrett or sooner if there are any problems between now and then.    Leave Review:   Google: Leave Google Review  Healthgrades: Leave Healthgrades Review    After Hours Number: [phone]

## 2023-08-24 NOTE — PROGRESS NOTES
Patient ID: John Barthelemy  YOB: 1975  MRN: [id number]    Chief Complaint: Pain of the Right Knee      Referred By: [facility]    History of Present Illness: John Barthelemy is a RHD 48 y.o. male   Data Unavailable with a chief complaint of Pain of the Right Knee    Jens presents to the clinic today for R knee pain. He rates his pain as a 10 out of 10 today. He reports it began 3 weeks ago when he jogged in his office travelling down stairs. He reports he has had stiffness in his knee with prolonged sitting. His knee pain is reported as throbbing with a sensation that its going to give out on him. He reports he has tried ice, elevation, and NSAIDs for pain as needed. He reports he has used a compressive knee brace that helps the stabilization but not the pain. He reports no prior hx of pain in his knee. He states he has the most pain at night and is unable to sleep.     HPI    Past Medical History:   Past Medical History:   Diagnosis Date    Chronic left shoulder pain 7/17/2017    Gout      History reviewed. No pertinent surgical history.  Family History   Problem Relation Age of Onset    No Known Problems Mother     Heart disease Father 39        Bypass    No Known Problems Sister     Cancer Paternal Grandmother      Social History     Socioeconomic History    Marital status:     Number of children: 2   Occupational History    Occupation: Educator   Tobacco Use    Smoking status: Never    Smokeless tobacco: Never   Substance and Sexual Activity    Alcohol use: Yes     Alcohol/week: 6.0 standard drinks of alcohol     Types: 6 Cans of beer per week     Comment: socially     Drug use: No    Sexual activity: Yes     Partners: Female     Medication List with Changes/Refills   Current Medications    ATORVASTATIN (LIPITOR) 20 MG TABLET    Take 1 tablet (20 mg total) by mouth once daily.    CICLOPIROX 0.77 % GEL    Apply topically 2 (two) times daily. To toe webspaces    COLCHICINE (COLCRYS) 0.6 MG  TABLET    Take 1 tablet (0.6 mg total) by mouth 2 (two) times daily.    INDOMETHACIN (INDOCIN) 50 MG CAPSULE    Take 1 capsule (50 mg total) by mouth 3 (three) times daily as needed (pain, gout).     Review of patient's allergies indicates:   Allergen Reactions    Betamethasone Other (See Comments)     Hiccups     ROS    Physical Exam:   Body mass index is 36.92 kg/m².  There were no vitals filed for this visit.   GENERAL: Well appearing, appropriate for stated age, no acute distress.  CARDIOVASCULAR: Pulses regular by peripheral palpation.  PULMONARY: Respirations are even and non-labored.  NEURO: Awake, alert, and oriented x 3.  PSYCH: Mood & affect are appropriate.  HEENT: Head is normocephalic and atraumatic.  Ortho/SPM Exam  ***    Imaging:    X-ray Knee Ortho Right with Flexion  Narrative: EXAMINATION:  XR KNEE ORTHO RIGHT WITH FLEXION    CLINICAL HISTORY:  Pain in right knee    TECHNIQUE:  AP standing as well as PA flexion standing and Merchant views of both knees were performed.  A lateral view of the right knee is also performed.    COMPARISON:  Prior radiographs    FINDINGS:  No acute abnormality or significant change.  Joint spaces maintained without significant arthritic findings.  Impression: As    Electronically signed by: Flash Sanon MD  Date:    08/24/2023  Time:    12:50    ***  Relevant imaging results reviewed and interpreted by me, discussed with the patient and / or family today. ***    Other Tests:     ***    There are no Patient Instructions on file for this visit.  Provider Note/Medical Decision Making: ***      I discussed worrisome and red flag signs and symptoms with the patient. The patient expressed understanding and agreed to alert me immediately or to go to the emergency room if they experience any of these.   Treatment plan was developed with input from the patient/family, and they expressed understanding and agreement with the plan. All questions were answered  today.          Brandan Tucker MD  Orthopaedic Surgery & Sports Medicine       Disclaimer: This note was prepared using a voice recognition system and is likely to have sound alike errors within the text.

## 2023-08-30 ENCOUNTER — HOSPITAL ENCOUNTER (OUTPATIENT)
Facility: HOSPITAL | Age: 48
Discharge: HOME | End: 2023-08-30
Attending: INTERNAL MEDICINE | Admitting: INTERNAL MEDICINE
Payer: COMMERCIAL

## 2023-08-30 VITALS
OXYGEN SATURATION: 98 % | TEMPERATURE: 98 F | RESPIRATION RATE: 18 BRPM | DIASTOLIC BLOOD PRESSURE: 71 MMHG | BODY MASS INDEX: 36.73 KG/M2 | HEART RATE: 72 BPM | HEIGHT: 69 IN | WEIGHT: 248 LBS | SYSTOLIC BLOOD PRESSURE: 115 MMHG

## 2023-08-30 DIAGNOSIS — Z12.11 ENCOUNTER FOR SCREENING COLONOSCOPY: Primary | ICD-10-CM

## 2023-08-30 PROCEDURE — D9220A PRA ANESTHESIA: ICD-10-PCS | Mod: 33,,, | Performed by: NURSE ANESTHETIST, CERTIFIED REGISTERED

## 2023-08-30 PROCEDURE — 63600175 PHARM REV CODE 636 W HCPCS: Performed by: NURSE ANESTHETIST, CERTIFIED REGISTERED

## 2023-08-30 PROCEDURE — 25000003 PHARM REV CODE 250: Performed by: INTERNAL MEDICINE

## 2023-08-30 PROCEDURE — 88305 TISSUE EXAM BY PATHOLOGIST: CPT | Performed by: STUDENT IN AN ORGANIZED HEALTH CARE EDUCATION/TRAINING PROGRAM

## 2023-08-30 PROCEDURE — 37000008 HC ANESTHESIA 1ST 15 MINUTES: Performed by: INTERNAL MEDICINE

## 2023-08-30 PROCEDURE — 45380 COLONOSCOPY AND BIOPSY: CPT | Mod: PT | Performed by: INTERNAL MEDICINE

## 2023-08-30 PROCEDURE — 45380 PR COLONOSCOPY,BIOPSY: ICD-10-PCS | Mod: 33,,, | Performed by: INTERNAL MEDICINE

## 2023-08-30 PROCEDURE — 88305 TISSUE EXAM BY PATHOLOGIST: ICD-10-PCS | Mod: 26,,, | Performed by: STUDENT IN AN ORGANIZED HEALTH CARE EDUCATION/TRAINING PROGRAM

## 2023-08-30 PROCEDURE — 37000009 HC ANESTHESIA EA ADD 15 MINS: Performed by: INTERNAL MEDICINE

## 2023-08-30 PROCEDURE — 88305 TISSUE EXAM BY PATHOLOGIST: CPT | Mod: 26,,, | Performed by: STUDENT IN AN ORGANIZED HEALTH CARE EDUCATION/TRAINING PROGRAM

## 2023-08-30 PROCEDURE — 45380 COLONOSCOPY AND BIOPSY: CPT | Mod: 33,,, | Performed by: INTERNAL MEDICINE

## 2023-08-30 PROCEDURE — 27201012 HC FORCEPS, HOT/COLD, DISP: Performed by: INTERNAL MEDICINE

## 2023-08-30 PROCEDURE — 63600175 PHARM REV CODE 636 W HCPCS: Performed by: INTERNAL MEDICINE

## 2023-08-30 PROCEDURE — D9220A PRA ANESTHESIA: Mod: 33,,, | Performed by: NURSE ANESTHETIST, CERTIFIED REGISTERED

## 2023-08-30 PROCEDURE — 25000003 PHARM REV CODE 250: Performed by: NURSE ANESTHETIST, CERTIFIED REGISTERED

## 2023-08-30 RX ORDER — SODIUM CHLORIDE 9 MG/ML
INJECTION, SOLUTION INTRAVENOUS CONTINUOUS
Status: ACTIVE | OUTPATIENT
Start: 2023-08-30

## 2023-08-30 RX ORDER — LIDOCAINE HYDROCHLORIDE 20 MG/ML
INJECTION, SOLUTION EPIDURAL; INFILTRATION; INTRACAUDAL; PERINEURAL
Status: DISCONTINUED | OUTPATIENT
Start: 2023-08-30 | End: 2023-08-30

## 2023-08-30 RX ORDER — PROPOFOL 10 MG/ML
VIAL (ML) INTRAVENOUS
Status: DISCONTINUED | OUTPATIENT
Start: 2023-08-30 | End: 2023-08-30

## 2023-08-30 RX ORDER — SODIUM CHLORIDE, SODIUM LACTATE, POTASSIUM CHLORIDE, CALCIUM CHLORIDE 600; 310; 30; 20 MG/100ML; MG/100ML; MG/100ML; MG/100ML
INJECTION, SOLUTION INTRAVENOUS CONTINUOUS
Status: ACTIVE | OUTPATIENT
Start: 2023-08-30

## 2023-08-30 RX ORDER — DEXTROMETHORPHAN/PSEUDOEPHED 2.5-7.5/.8
DROPS ORAL
Status: DISCONTINUED | OUTPATIENT
Start: 2023-08-30 | End: 2023-08-30 | Stop reason: HOSPADM

## 2023-08-30 RX ADMIN — PROPOFOL 30 MG: 10 INJECTION, EMULSION INTRAVENOUS at 12:08

## 2023-08-30 RX ADMIN — PROPOFOL 50 MG: 10 INJECTION, EMULSION INTRAVENOUS at 12:08

## 2023-08-30 RX ADMIN — PROPOFOL 140 MG: 10 INJECTION, EMULSION INTRAVENOUS at 12:08

## 2023-08-30 RX ADMIN — PROPOFOL 40 MG: 10 INJECTION, EMULSION INTRAVENOUS at 12:08

## 2023-08-30 RX ADMIN — PROPOFOL 20 MG: 10 INJECTION, EMULSION INTRAVENOUS at 12:08

## 2023-08-30 RX ADMIN — SODIUM CHLORIDE, POTASSIUM CHLORIDE, SODIUM LACTATE AND CALCIUM CHLORIDE: 600; 310; 30; 20 INJECTION, SOLUTION INTRAVENOUS at 10:08

## 2023-08-30 RX ADMIN — LIDOCAINE HYDROCHLORIDE 100 MG: 20 INJECTION, SOLUTION EPIDURAL; INFILTRATION; INTRACAUDAL; PERINEURAL at 12:08

## 2023-08-30 NOTE — [employer]ATION PATIENT INSTRUCTIONS
Discharge Summary/Instructions after an Endoscopic Procedure  Patient Name: John Barthelemy  Patient MRN: [id number]  Patient YOB: 1975 Wednesday, August 30, 2023  Evy Ceja MD  Dear patient,  As a result of recent federal legislation (The Federal Cures Act), you may   receive lab or pathology results from your procedure in your [facility]   account before your physician is able to contact you. Your physician or   their representative will relay the results to you with their   recommendations at their soonest availability.  Thank you,  RESTRICTIONS:  During your procedure today, you received medications for sedation.  These   medications may affect your judgment, balance and coordination.  Therefore,   for 24 hours, you have the following restrictions:   - DO NOT drive a car, operate machinery, make legal/financial decisions,   sign important papers or drink alcohol.    ACTIVITY:  Today: no heavy lifting, straining or running due to procedural   sedation/anesthesia.  The following day: return to full activity including work.  DIET:  Eat and drink normally unless instructed otherwise.     TREATMENT FOR COMMON SIDE EFFECTS:  - Mild abdominal pain, nausea, belching, bloating or excessive gas:  rest,   eat lightly and use a heating pad.  - Sore Throat: treat with throat lozenges and/or gargle with warm salt   water.  - Because air was used during the procedure, expelling large amounts of air   from your rectum or belching is normal.  - If a bowel prep was taken, you may not have a bowel movement for 1-3 days.    This is normal.  SYMPTOMS TO WATCH FOR AND REPORT TO YOUR PHYSICIAN:  1. Abdominal pain or bloating, other than gas cramps.  2. Chest pain.  3. Back pain.  4. Signs of infection such as: chills or fever occurring within 24 hours   after the procedure.  5. Rectal bleeding, which would show as bright red, maroon, or black stools.   (A tablespoon of blood from the rectum is not serious,  especially if   hemorrhoids are present.)  6. Vomiting.  7. Weakness or dizziness.  GO DIRECTLY TO THE NEAREST EMERGENCY ROOM IF YOU HAVE ANY OF THE FOLLOWING:      Difficulty breathing              Chills and/or fever over 101 F   Persistent vomiting and/or vomiting blood   Severe abdominal pain   Severe chest pain   Black, tarry stools   Bleeding- more than one tablespoon   Any other symptom or condition that you feel may need urgent attention  Your doctor recommends these additional instructions:  If any biopsies were taken, your doctors clinic will contact you in 1 to 2   weeks with any results.  - Discharge patient to home (via wheelchair).   - Resume previous diet.   - Continue present medications.   - Await pathology results.   - Repeat colonoscopy in 5 years for surveillance.   - Patient has a contact number available for emergencies.  The signs and   symptoms of potential delayed complications were discussed with the   patient.  Return to normal activities tomorrow.  Written discharge   instructions were provided to the patient.  For questions, problems or results please call your physician Evy Ceja MD at Work:  [phone]  If you have any questions about the above instructions, call the GI   department at [phone] or call the endoscopy unit at [phone]   from 7am until 3 pm.  [facility] [place], EMERGENCY ROOM PHONE NUMBER:   [phone]  IF A COMPLICATION OR EMERGENCY SITUATION ARISES AND YOU ARE UNABLE TO REACH   YOUR PHYSICIAN - GO DIRECTLY TO THE EMERGENCY ROOM.  I have read or have had read to me these discharge instructions for my   procedure and have received a written copy.  I understand these   instructions and will follow-up with my physician if I have any questions.     __________________________________       _____________________________________  Nurse Signature                                          Patient/Designated   Responsible Party  Signature  MD Evy Walls MD  8/30/2023 12:22:55 PM  [employer]

## 2023-08-30 NOTE — PLAN OF CARE
Discharge instructions reviewed with pt, handouts given, verbalized understanding with no further questions at this time. Dr. Ceja spoke to pt at bedside, reviewed procedure and answered questions aware they are awaiting biopsy results with MD telephone number provided per AVS sheet. VSS on RA, no pain or nausea noted, tolerating po fluids without difficulty, no other complaints noted. Fall precautions reviewed, consents in chart, PIV to be removed at discharge.

## 2023-08-30 NOTE — DISCHARGE SUMMARY
[facility] - Endoscopy 1st Fl  Discharge Note  Short Stay    Procedure(s) (LRB):  COLONOSCOPY (N/A)      OUTCOME: Patient tolerated treatment/procedure well without complication and is now ready for discharge.    DISPOSITION: Home or Self Care    FINAL DIAGNOSIS:  Encounter for screening colonoscopy    FOLLOWUP: With primary care provider    DISCHARGE INSTRUCTIONS:  No discharge procedures on file.

## 2023-08-30 NOTE — TRANSFER OF CARE
"Anesthesia Transfer of Care Note    Patient: John Barthelemy    Procedure(s) Performed: Procedure(s) (LRB):  COLONOSCOPY (N/A)    Patient location: PACU    Anesthesia Type: general    Transport from OR: Transported from OR on room air with adequate spontaneous ventilation    Post pain: adequate analgesia    Post assessment: no apparent anesthetic complications and tolerated procedure well    Post vital signs: stable    Level of consciousness: awake, alert and oriented    Nausea/Vomiting: no nausea/vomiting    Complications: none    Transfer of care protocol was followed      Last vitals:   Visit Vitals  /82 (BP Location: Right arm, Patient Position: Sitting)   Pulse 63   Temp 36.8 °C (98.3 °F) (Temporal)   Resp 16   Ht 5' 9" (1.753 m)   Wt 112.5 kg (248 lb 0.3 oz)   SpO2 96%   BMI 36.63 kg/m²     "

## 2023-08-30 NOTE — ANESTHESIA POSTPROCEDURE EVALUATION
Anesthesia Post Evaluation    Patient: John Barthelemy    Procedure(s) Performed: Procedure(s) (LRB):  COLONOSCOPY (N/A)    Final Anesthesia Type: general      Patient location during evaluation: GI PACU  Patient participation: Yes- Able to Participate  Level of consciousness: awake  Post-procedure vital signs: reviewed and stable  Pain management: adequate  Airway patency: patent    PONV status at discharge: No PONV  Anesthetic complications: no      Cardiovascular status: stable  Respiratory status: unassisted  Hydration status: euvolemic  Follow-up not needed.          Vitals Value Taken Time   /73 08/30/23 1247     08/30/23 1248   Pulse 57 08/30/23 1247   Resp 21 08/30/23 1247   SpO2 96 % 08/30/23 1247   Vitals shown include unvalidated device data.      Event Time   Out of Recovery 12:54:00         Pain/Chapis Score: Chapis Score: 9 (8/30/2023 12:34 PM)

## 2023-08-30 NOTE — H&P
Short Stay Endoscopy History and Physical    PCP - Orlando Fuchs MD    Procedure - Colonoscopy  ASA - 2  Mallampati - per anesthesia  History of Anesthesia problems - no  Family history Anesthesia problems -  no     HPI:  This is a 48 y.o. male here for evaluation of :   Active Hospital Problems    Diagnosis  POA    *Encounter for screening colonoscopy [Z12.11]  Not Applicable      Resolved Hospital Problems   No resolved problems to display.         Health Maintenance         Date Due Completion Date    Colorectal Cancer Screening Never done ---    COVID-19 Vaccine (4 - Moderna series) 02/15/2022 12/21/2021    Influenza Vaccine (1) 09/01/2023 ---    Hemoglobin A1c (Diabetic Prevention Screening) 06/27/2026 6/27/2023    Lipid Panel 06/27/2028 6/27/2023    TETANUS VACCINE 06/26/2033 6/26/2023              ROS:  CONSTITUTIONAL: Denies weight change,  fatigue, fevers, chills, night sweats.  CARDIOVASCULAR: Denies chest pain, shortness of breath, orthopnea and edema.  RESPIRATORY: Denies cough, hemoptysis, dyspnea, and wheezing.  GI: See HPI.    Medical History:   Past Medical History:   Diagnosis Date    Chronic left shoulder pain 7/17/2017    Gout        Surgical History:   History reviewed. No pertinent surgical history.    Family History:   Family History   Problem Relation Age of Onset    No Known Problems Mother     Heart disease Father 39        Bypass    No Known Problems Sister     Cancer Paternal Grandmother        Social History:   Social History     Tobacco Use    Smoking status: Never    Smokeless tobacco: Never   Substance Use Topics    Alcohol use: Yes     Alcohol/week: 6.0 standard drinks of alcohol     Types: 6 Cans of beer per week     Comment: socially     Drug use: No       Allergies:   Review of patient's allergies indicates:   Allergen Reactions    Betamethasone Other (See Comments)     Hiccups       Medications:   No current facility-administered medications on file prior to encounter.      Current Outpatient Medications on File Prior to Encounter   Medication Sig Dispense Refill    atorvastatin (LIPITOR) 20 MG tablet Take 1 tablet (20 mg total) by mouth once daily. 90 tablet 4    indomethacin (INDOCIN) 50 MG capsule Take 1 capsule (50 mg total) by mouth 3 (three) times daily as needed (pain, gout). 30 capsule 1    colchicine (COLCRYS) 0.6 mg tablet Take 1 tablet (0.6 mg total) by mouth 2 (two) times daily. (Patient not taking: Reported on 6/26/2023) 60 tablet 11       Physical Exam:  Vital Signs:   Vitals:    08/30/23 1048   BP: 133/82   Pulse: 63   Resp: 16   Temp: 98.3 °F (36.8 °C)     General Appearance: Well appearing in no acute distress  ENT: OP clear  Chest: CTA B  CV: RRR, no m/r/g  Abd: s/nt/nd/nabs  Ext: no edema    Labs:Reviewed    IMP:  Active Hospital Problems    Diagnosis  POA    *Encounter for screening colonoscopy [Z12.11]  Not Applicable      Resolved Hospital Problems   No resolved problems to display.         Plan:   I have explained the risks and benefits of colonoscopy to the patient including but not limited to bleeding, perforation, infection, and death. The patient wishes to proceed.

## 2023-08-31 ENCOUNTER — LAB VISIT (OUTPATIENT)
Dept: LAB | Facility: HOSPITAL | Age: 48
End: 2023-08-31
Attending: ORTHOPAEDIC SURGERY
Payer: COMMERCIAL

## 2023-08-31 ENCOUNTER — OFFICE VISIT (OUTPATIENT)
Dept: SPORTS MEDICINE | Facility: CLINIC | Age: 48
End: 2023-08-31
Payer: COMMERCIAL

## 2023-08-31 VITALS — WEIGHT: 248 LBS | HEIGHT: 69 IN | BODY MASS INDEX: 36.73 KG/M2

## 2023-08-31 DIAGNOSIS — M25.461 SWELLING OF RIGHT KNEE JOINT: ICD-10-CM

## 2023-08-31 DIAGNOSIS — M17.11 PRIMARY OSTEOARTHRITIS OF RIGHT KNEE: ICD-10-CM

## 2023-08-31 DIAGNOSIS — M25.461 SWELLING OF RIGHT KNEE JOINT: Primary | ICD-10-CM

## 2023-08-31 DIAGNOSIS — M1A.0610 CHRONIC GOUT OF RIGHT KNEE, UNSPECIFIED CAUSE: ICD-10-CM

## 2023-08-31 DIAGNOSIS — E66.01 CLASS 2 SEVERE OBESITY WITH SERIOUS COMORBIDITY AND BODY MASS INDEX (BMI) OF 36.0 TO 36.9 IN ADULT, UNSPECIFIED OBESITY TYPE: ICD-10-CM

## 2023-08-31 PROCEDURE — 85652 RBC SED RATE AUTOMATED: CPT | Performed by: ORTHOPAEDIC SURGERY

## 2023-08-31 PROCEDURE — 99999 PR PBB SHADOW E&M-EST. PATIENT-LVL III: ICD-10-PCS | Mod: PBBFAC,,, | Performed by: ORTHOPAEDIC SURGERY

## 2023-08-31 PROCEDURE — 36415 COLL VENOUS BLD VENIPUNCTURE: CPT | Performed by: ORTHOPAEDIC SURGERY

## 2023-08-31 PROCEDURE — 3044F HG A1C LEVEL LT 7.0%: CPT | Mod: CPTII,S$GLB,, | Performed by: ORTHOPAEDIC SURGERY

## 2023-08-31 PROCEDURE — 3008F PR BODY MASS INDEX (BMI) DOCUMENTED: ICD-10-PCS | Mod: CPTII,S$GLB,, | Performed by: ORTHOPAEDIC SURGERY

## 2023-08-31 PROCEDURE — 99999 PR PBB SHADOW E&M-EST. PATIENT-LVL III: CPT | Mod: PBBFAC,,, | Performed by: ORTHOPAEDIC SURGERY

## 2023-08-31 PROCEDURE — 86140 C-REACTIVE PROTEIN: CPT | Performed by: ORTHOPAEDIC SURGERY

## 2023-08-31 PROCEDURE — 3044F PR MOST RECENT HEMOGLOBIN A1C LEVEL <7.0%: ICD-10-PCS | Mod: CPTII,S$GLB,, | Performed by: ORTHOPAEDIC SURGERY

## 2023-08-31 PROCEDURE — 1159F PR MEDICATION LIST DOCUMENTED IN MEDICAL RECORD: ICD-10-PCS | Mod: CPTII,S$GLB,, | Performed by: ORTHOPAEDIC SURGERY

## 2023-08-31 PROCEDURE — 1159F MED LIST DOCD IN RCRD: CPT | Mod: CPTII,S$GLB,, | Performed by: ORTHOPAEDIC SURGERY

## 2023-08-31 PROCEDURE — 99214 PR OFFICE/OUTPT VISIT, EST, LEVL IV, 30-39 MIN: ICD-10-PCS | Mod: S$GLB,,, | Performed by: ORTHOPAEDIC SURGERY

## 2023-08-31 PROCEDURE — 3008F BODY MASS INDEX DOCD: CPT | Mod: CPTII,S$GLB,, | Performed by: ORTHOPAEDIC SURGERY

## 2023-08-31 PROCEDURE — 99214 OFFICE O/P EST MOD 30 MIN: CPT | Mod: S$GLB,,, | Performed by: ORTHOPAEDIC SURGERY

## 2023-08-31 NOTE — PATIENT INSTRUCTIONS
Assessment:  John Barthelemy is a 48 y.o. male   [facility] with a chief complaint of Pain of the Right Knee    Right knee effusion  Possible Gout episode  Possible OA flareup  Possible infxn, but less likely    Encounter Diagnoses   Name Primary?    Swelling of right knee joint Yes    Chronic gout of right knee, unspecified cause     Primary osteoarthritis of right knee     Class 2 severe obesity with serious comorbidity and body mass index (BMI) of 36.0 to 36.9 in adult, unspecified obesity type       Plan:  Continue knee sleeve   Recheck inflammatory  markers  If not improving in 3 week, may consider CSI  Continue colchicine as prescribed by pcp.    Although there is not a cure for arthritis, there are effective ways to improve symptoms.     Exercise & Activity:  I recommend low impact activities such as elliptical and bicycle   Walking is great for arthritis: https://www.[employer]/3-reasons-walking-with-knee-arthritis/  If walking long distances, I recommend good quality well-cushioned shoes. Varsity sports can help you find the right ones: https://www.[employer]. [employer].[place]/  Aquatic and pool therapy is often helpful because it lessens the impact on the joint, strengthens the leg and thigh muscles, and helps to control swelling.   Knee motion is important to the health of the knee.     Knee Braces:  A compression knee sleeve can help limit swelling and provide proprioceptive feedback.     Prescriptions & Medications:  I do recommend formal physical therapy or at minimum a home exercise program.   Over the counter analgesic (pain-relieving) medications can help. Examples are Tylenol, Ibuprofen, and Aleve. Check with your primary care physician to make sure you don't have contra-indications to taking those medicines.  Some over the counter supplement solutions such as glucosamine and chondroitin may help with symptoms, although the evidence is mixed.    Healthy Lifestyle:  Excess body  weight can have a negative impact on joint health and on pain. I recommend healthy lifestyle choices including nutrition and exercise that help reach and maintain an ideal body weight. Tips for Exercise: https://www.[employer]/13-exercise-tips-for-a-healthier-you/  Some diets cause increased inflammation. I recommend a balanced wholesome diet including some foods such as olives that are shown to decrease inflammation. More diet information available here: https://www.[employer]/8-best-foods-for-knee-arthritis/    Follow-up: 3-4 weeks or sooner if there are any problems between now and then.    Leave Review:   Google: Leave Google Review  Healthgrades: Leave Healthgrades Review    After Hours Number: [phone]

## 2023-08-31 NOTE — PROGRESS NOTES
Patient ID: John Barthelemy  YOB: 1975  MRN: [id number]    Chief Complaint: Pain of the Right Knee    Referred By: [facility]    History of Present Illness: John Barthelemy is a RHD 48 y.o. male   [place] with a chief complaint of Pain of the Right Knee    The patient presents today with right knee follow up. He reports the pain and swelling have improved. He is not walking with no pain. He reports improvement in the range of motion.     Prev HPI: Jens presents to the clinic today for R knee pain. He rates his pain as a 10 out of 10 today. He reports it began 3 weeks ago when he jogged in his office travelling down stairs. He reports he has had stiffness in his knee with prolonged sitting. His knee pain is reported as throbbing with a sensation that its going to give out on him. He reports he has tried ice, elevation, and NSAIDs for pain as needed. He reports he has used a compressive knee brace that helps the stabilization but not the pain. He reports no prior hx of pain in his knee. He states he has the most pain at night and is unable to sleep.  He states that he has a history of gout.    HPI    Past Medical History:   Past Medical History:   Diagnosis Date    Chronic left shoulder pain 7/17/2017    Gout      Past Surgical History:   Procedure Laterality Date    COLONOSCOPY N/A 8/30/2023    Procedure: COLONOSCOPY;  Surgeon: Evy Ceja MD;  Location: [facility];  Service: Endoscopy;  Laterality: N/A;     Family History   Problem Relation Age of Onset    No Known Problems Mother     Heart disease Father 39        Bypass    No Known Problems Sister     Cancer Paternal Grandmother      Social History     Socioeconomic History    Marital status:     Number of children: 2   Occupational History    Occupation: Educator   Tobacco Use    Smoking status: Never    Smokeless tobacco: Never   Substance and Sexual Activity    Alcohol use: Yes     Alcohol/week: 6.0  standard drinks of alcohol     Types: 6 Cans of beer per week     Comment: socially     Drug use: No    Sexual activity: Yes     Partners: Female     Medication List with Changes/Refills   Current Medications    ATORVASTATIN (LIPITOR) 20 MG TABLET    Take 1 tablet (20 mg total) by mouth once daily.    CICLOPIROX 0.77 % GEL    Apply topically 2 (two) times daily. To toe webspaces    COLCHICINE (COLCRYS) 0.6 MG TABLET    Take 1 tablet (0.6 mg total) by mouth 2 (two) times daily.    INDOMETHACIN (INDOCIN) 50 MG CAPSULE    Take 1 capsule (50 mg total) by mouth 3 (three) times daily as needed (pain, gout).     Review of patient's allergies indicates:   Allergen Reactions    Betamethasone Other (See Comments)     Hiccups     ROS    Physical Exam:   Body mass index is 36.63 kg/m².  There were no vitals filed for this visit.   GENERAL: Well appearing, appropriate for stated age, no acute distress.  CARDIOVASCULAR: Pulses regular by peripheral palpation.  PULMONARY: Respirations are even and non-labored.  NEURO: Awake, alert, and oriented x 3.  PSYCH: Mood & affect are appropriate.  HEENT: Head is normocephalic and atraumatic.            Right Knee Exam     Inspection   Erythema: present  Effusion: present    Tenderness   The patient is tender to palpation of the patella.    Crepitus   The patient has crepitus of the patella.    Range of Motion   Extension:  0   Flexion:  120     Comments:  Mild effusion  Some warmth   Intact EHL, FHL, gastrocsoleus, and tibialis anterior. Sensation intact to light touch in superficial peroneal, deep peroneal, tibial, sural, and saphenous nerve distributions. Foot warm and well perfused with capillary refill of less than 2 seconds and palpable pedal pulses.          Imaging:    X-ray Knee Ortho Right with Flexion  Narrative: EXAMINATION:  XR KNEE ORTHO RIGHT WITH FLEXION    CLINICAL HISTORY:  Pain in right knee    TECHNIQUE:  AP standing as well as PA flexion standing and Merchant views of  both knees were performed.  A lateral view of the right knee is also performed.    COMPARISON:  Prior radiographs    FINDINGS:  No acute abnormality or significant change.  Joint spaces maintained without significant arthritic findings.  Impression: As    Electronically signed by: Flash Sanon MD  Date:    08/24/2023  Time:    12:50      Relevant imaging results reviewed and interpreted by me, discussed with the patient and / or family today.     Other Tests:         Patient Instructions   Assessment:  John Barthelemy is a 48 y.o. male   [facility] with a chief complaint of Pain of the Right Knee    Right knee effusion  Possible Gout episode  Possible OA flareup  Possible infxn, but less likely    Encounter Diagnoses   Name Primary?    Swelling of right knee joint Yes    Chronic gout of right knee, unspecified cause     Primary osteoarthritis of right knee     Class 2 severe obesity with serious comorbidity and body mass index (BMI) of 36.0 to 36.9 in adult, unspecified obesity type       Plan:  Continue knee sleeve   Recheck inflammatory  markers  If not improving in 3 week, may consider CSI  Continue colchicine as prescribed by pcp.    Although there is not a cure for arthritis, there are effective ways to improve symptoms.     Exercise & Activity:  I recommend low impact activities such as elliptical and bicycle   Walking is great for arthritis: https://www.TIDAL PETROLEUM.com/3-reasons-walking-with-knee-arthritis/  If walking long distances, I recommend good quality well-cushioned shoes. Varsity sports can help you find the right ones: https://www.scoo mobilitysityrunning.com/  Aquatic and pool therapy is often helpful because it lessens the impact on the joint, strengthens the leg and thigh muscles, and helps to control swelling.   Knee motion is important to the health of the knee.     Knee Braces:  A compression knee sleeve can help limit swelling and provide proprioceptive feedback.      Prescriptions & Medications:  I do recommend formal physical therapy or at minimum a home exercise program.   Over the counter analgesic (pain-relieving) medications can help. Examples are Tylenol, Ibuprofen, and Aleve. Check with your primary care physician to make sure you don't have contra-indications to taking those medicines.  Some over the counter supplement solutions such as glucosamine and chondroitin may help with symptoms, although the evidence is mixed.    Healthy Lifestyle:  Excess body weight can have a negative impact on joint health and on pain. I recommend healthy lifestyle choices including nutrition and exercise that help reach and maintain an ideal body weight. Tips for Exercise: https://www.Pomogatel.Kid$Shirt/13-exercise-tips-for-a-healthier-you/  Some diets cause increased inflammation. I recommend a balanced wholesome diet including some foods such as olives that are shown to decrease inflammation. More diet information available here: https://www.Pomogatel.Kid$Shirt/8-best-foods-for-knee-arthritis/    Follow-up: 3-4 weeks or sooner if there are any problems between now and then.    Leave Review:   Google: Leave Google Review  Healthgrades: Leave Healthgrades Review    After Hours Number: [phone]     Provider Note/Medical Decision Making:       I discussed worrisome and red flag signs and symptoms with the patient. The patient expressed understanding and agreed to alert me immediately or to go to the emergency room if they experience any of these.   Treatment plan was developed with input from the patient/family, and they expressed understanding and agreement with the plan. All questions were answered today.          Brandan Tucker MD  Orthopaedic Surgery & Sports Medicine       Disclaimer: This note was prepared using a voice recognition system and is likely to have sound alike errors within the text.     I, Elidia Krause, acted as a scribe for Brandan Tucker MD for  the duration of this office visit.

## 2023-09-01 LAB
CRP SERPL-MCNC: 9 MG/L (ref 0–8.2)
ERYTHROCYTE [SEDIMENTATION RATE] IN BLOOD BY PHOTOMETRIC METHOD: 14 MM/HR (ref 0–23)

## 2023-09-05 LAB
FINAL PATHOLOGIC DIAGNOSIS: NORMAL
Lab: NORMAL

## 2023-09-13 DIAGNOSIS — E78.2 MIXED HYPERLIPIDEMIA: ICD-10-CM

## 2023-09-13 RX ORDER — CICLOPIROX 7.7 MG/G
GEL TOPICAL 2 TIMES DAILY
Qty: 30 G | Refills: 0 | Status: SHIPPED | OUTPATIENT
Start: 2023-09-13 | End: 2023-10-16 | Stop reason: SDUPTHER

## 2023-09-13 RX ORDER — ATORVASTATIN CALCIUM 20 MG/1
20 TABLET, FILM COATED ORAL DAILY
Qty: 90 TABLET | Refills: 4 | Status: SHIPPED | OUTPATIENT
Start: 2023-09-13 | End: 2023-10-16 | Stop reason: SDUPTHER

## 2023-10-23 RX ORDER — CICLOPIROX 7.7 MG/G
GEL TOPICAL 2 TIMES DAILY
Qty: 30 G | Refills: 0 | Status: SHIPPED | OUTPATIENT
Start: 2023-10-23 | End: 2023-12-02 | Stop reason: SDUPTHER

## 2023-12-02 DIAGNOSIS — M10.071 IDIOPATHIC GOUT INVOLVING TOE OF RIGHT FOOT, UNSPECIFIED CHRONICITY: ICD-10-CM

## 2023-12-03 NOTE — TELEPHONE ENCOUNTER
No care due was identified.  [facility] Embedded Care Due Messages. Reference number: [id number].   12/02/2023 7:21:16 PM CST

## 2023-12-04 RX ORDER — INDOMETHACIN 50 MG/1
50 CAPSULE ORAL 3 TIMES DAILY PRN
Qty: 30 CAPSULE | Refills: 1 | Status: SHIPPED | OUTPATIENT
Start: 2023-12-04 | End: 2023-12-24

## 2023-12-04 NOTE — TELEPHONE ENCOUNTER
Refill Routing Note   Medication(s) are not appropriate for processing by [facility] for the following reason(s):        Outside of protocol    [facility] action(s):  Route               Appointments  past 12m or future 3m with PCP    Date Provider   Last Visit   1/19/2022 Orlando Fuchs MD   Next Visit   12/28/2023 Orlando Fuchs MD   ED visits in past 90 days: 0        Note composed:8:52 AM 12/04/2023

## 2023-12-08 RX ORDER — CICLOPIROX 7.7 MG/G
GEL TOPICAL 2 TIMES DAILY
Qty: 30 G | Refills: 0 | Status: SHIPPED | OUTPATIENT
Start: 2023-12-08

## 2023-12-24 DIAGNOSIS — M10.071 IDIOPATHIC GOUT INVOLVING TOE OF RIGHT FOOT, UNSPECIFIED CHRONICITY: ICD-10-CM

## 2023-12-24 RX ORDER — INDOMETHACIN 50 MG/1
CAPSULE ORAL
Qty: 30 CAPSULE | Refills: 1 | Status: SHIPPED | OUTPATIENT
Start: 2023-12-24 | End: 2024-01-10

## 2023-12-24 NOTE — TELEPHONE ENCOUNTER
Care Due:                  Date            Visit Type   Department     Provider  --------------------------------------------------------------------------------                                NP -                              PRIMARY      [facility] INTERNAL  Last Visit: 01-      CARE ([facility])   MEDICINE       Orlando Fuchs  Next Visit: None Scheduled  None         None Found                                                            Last  Test          Frequency    Reason                     Performed    Due Date  --------------------------------------------------------------------------------    Office Visit  12 months..  atorvastatin,              01- 01-                             indomethacin.............    [employer] Embedded Care Due Messages. Reference number: [id number].   12/24/2023 4:42:36 AM CST

## 2024-01-10 DIAGNOSIS — M10.071 IDIOPATHIC GOUT INVOLVING TOE OF RIGHT FOOT, UNSPECIFIED CHRONICITY: ICD-10-CM

## 2024-01-10 RX ORDER — INDOMETHACIN 50 MG/1
CAPSULE ORAL
Qty: 30 CAPSULE | Refills: 1 | Status: SHIPPED | OUTPATIENT
Start: 2024-01-10

## 2024-01-10 NOTE — TELEPHONE ENCOUNTER
No care due was identified.  Health [facility] Embedded Care Due Messages. Reference number: [id number].   1/10/2024 4:43:48 AM CST

## 2024-04-21 ENCOUNTER — ON-DEMAND VIRTUAL (OUTPATIENT)
Dept: URGENT CARE | Facility: CLINIC | Age: 49
End: 2024-04-21
Payer: COMMERCIAL

## 2024-04-21 DIAGNOSIS — M10.9 ACUTE GOUT OF RIGHT ANKLE, UNSPECIFIED CAUSE: Primary | ICD-10-CM

## 2024-04-21 DIAGNOSIS — M10.071 ACUTE IDIOPATHIC GOUT OF RIGHT FOOT: ICD-10-CM

## 2024-04-21 PROCEDURE — 99213 OFFICE O/P EST LOW 20 MIN: CPT | Mod: 95,,, | Performed by: PHYSICIAN ASSISTANT

## 2024-04-21 RX ORDER — INDOMETHACIN 50 MG/1
50 CAPSULE ORAL
Qty: 30 CAPSULE | Refills: 0 | Status: SHIPPED | OUTPATIENT
Start: 2024-04-21

## 2024-04-21 RX ORDER — COLCHICINE 0.6 MG/1
TABLET ORAL
Qty: 20 TABLET | Refills: 0 | Status: SHIPPED | OUTPATIENT
Start: 2024-04-21

## 2024-04-22 NOTE — PROGRESS NOTES
Subjective:      Patient ID: John Barthelemy is a 48 y.o. male.    Vitals:  vitals were not taken for this visit.     Chief Complaint: gout flare      Visit Type: TELE AUDIOVISUAL    Present with the patient at the time of consultation: TELEMED PRESENT WITH PATIENT: None  Location - at home in [place].     Past Medical History:   Diagnosis Date    Chronic left shoulder pain 7/17/2017    Gout      Past Surgical History:   Procedure Laterality Date    COLONOSCOPY N/A 8/30/2023    Procedure: COLONOSCOPY;  Surgeon: Evy Ceja MD;  Location: [facility];  Service: Endoscopy;  Laterality: N/A;     Review of patient's allergies indicates:   Allergen Reactions    Betamethasone Other (See Comments)     Hiccups     Current Outpatient Medications on File Prior to Visit   Medication Sig Dispense Refill    atorvastatin (LIPITOR) 20 MG tablet Take 1 tablet (20 mg total) by mouth once daily. 90 tablet 4    ciclopirox 0.77 % Gel Apply topically 2 (two) times daily. To toe webspaces 30 g 0    [DISCONTINUED] colchicine (COLCRYS) 0.6 mg tablet Take 1 tablet (0.6 mg total) by mouth 2 (two) times daily. (Patient not taking: Reported on 6/26/2023) 60 tablet 11    [DISCONTINUED] indomethacin (INDOCIN) 50 MG capsule TAKE 1 CAPSULE(50 MG) BY MOUTH THREE TIMES DAILY AS NEEDED FOR PAIN OR GOUT 30 capsule 1     Current Facility-Administered Medications on File Prior to Visit   Medication Dose Route Frequency Provider Last Rate Last Admin    0.9%  NaCl infusion   Intravenous Continuous Minoo Glynn MD        lactated ringers infusion   Intravenous Continuous Minoo Glynn MD 10 mL/hr at 08/30/23 1055 Restarted at 08/30/23 1223     Family History   Problem Relation Name Age of Onset    No Known Problems Mother      Heart disease Father  39        Bypass    No Known Problems Sister      Cancer Paternal Grandmother         Medications Ordered                [id number] [address] [facility] [address] [facility]  [address] [address]   [address] [address] [address]    Telephone: [phone]   Fax: [phone]   Hours: Not open 24 hours                         E-Prescribed (2 of 2)              colchicine (COLCRYS) 0.6 mg tablet    Sig: Day 1 - Take two tabs PO now, repeat one tab in 1 hour. Day 2 and thereafter- Then one tab daily until 48 hours after flare resolves.       Start: 4/21/24     Quantity: 20 tablet Refills: 0                         indomethacin (INDOCIN) 50 MG capsule    Sig: Take 1 capsule (50 mg total) by mouth 3 (three) times daily with meals. Continue to take for few days after flare resolves.       Start: 4/21/24     Quantity: 30 capsule Refills: 0                           [facility] Peq Odvv Intake    4/21/2024  8:43 PM CDT - Filed by Patient   What is your current physical address in the event of a medical emergency? [address] [facility] [address]    Are you able to take your vital signs? No   Please attach any relevant images or files          HPI  49yo male with h/o gout presents with c/o gout flare in right ankle and right second toe x today. Notes redness, swelling, warm to touch. No rash/sores. No red streaking. Able to bear weight but with pain. Hx of same areas. No new injury. Trigger by his diet per patient.     Indomethacin and Colchicine - has taken previously for flares.     No hx of GI bleeding.       Constitution: Negative for chills and fever.   Gastrointestinal: Negative.    Skin:         See HPI        Objective:   The physical exam was conducted virtually.  Physical Exam   Constitutional: He is oriented to person, place, and time.  Non-toxic appearance. He does not appear ill. No distress.   HENT:   Head: Normocephalic and atraumatic.   Neck: Neck supple.   Pulmonary/Chest: Effort normal. No respiratory distress.   Abdominal: Normal appearance.   Musculoskeletal:      Comments: R ankle/foot - mild STS and erythema around ankle and second toe. No red streaking. Able to bear weight but with  pain.    Neurological: He is alert and oriented to person, place, and time. Coordination normal.   Skin: Skin is dry, not diaphoretic, not pale and no rash.   Psychiatric: His behavior is normal. Judgment and thought content normal.       Assessment:     1. Acute gout of right ankle, unspecified cause    2. Acute idiopathic gout of right foot        Plan:       Acute gout of right ankle, unspecified cause    Acute idiopathic gout of right foot    Other orders  -     indomethacin (INDOCIN) 50 MG capsule; Take 1 capsule (50 mg total) by mouth 3 (three) times daily with meals. Continue to take for few days after flare resolves.  Dispense: 30 capsule; Refill: 0  -     colchicine (COLCRYS) 0.6 mg tablet; Day 1 - Take two tabs PO now, repeat one tab in 1 hour. Day 2 and thereafter- Then one tab daily until 48 hours after flare resolves.  Dispense: 20 tablet; Refill: 0      1. As discussed, I have sent over a refill of your colchicine and indomethacin to restart for this gout flare; please take as directed. If no improvement in 3-4 days please follow up here or at local urgent care, or sooner if worsening or new symptoms.   2. You must understand that you've received a Telehealth Urgent Care treatment only and that you may be released before all your medical problems are known or treated. You, the patient, will arrange for follow up care as instructed.  ?Patient voiced understanding and agrees to plan.

## 2024-04-22 NOTE — PATIENT INSTRUCTIONS
1. As discussed, I have sent over a refill of your colchicine and indomethacin to restart for this gout flare; please take as directed. If no improvement in 3-4 days please follow up here or at local urgent care, or sooner if worsening or new symptoms.   2. You must understand that you've received a Telehealth Urgent Care treatment only and that you may be released before all your medical problems are known or treated. You, the patient, will arrange for follow up care as instructed.

## 2024-05-30 ENCOUNTER — TELEPHONE (OUTPATIENT)
Dept: INTERNAL MEDICINE | Facility: CLINIC | Age: 49
End: 2024-05-30
Payer: COMMERCIAL

## 2024-05-30 NOTE — TELEPHONE ENCOUNTER
----- Message from Sushma Camacho sent at 5/30/2024  9:16 AM CDT -----  Contact: [phone]  Type:  Same Day Appointment Request    Caller is requesting a same day appointment.  Caller declined first available appointment listed below.    Name of Caller:Jens  When is the first available appointment?06/27/2024  Symptoms:swollen gland  Best Call Back Number:[phone]   Additional Information:requesting blood work

## 2024-05-30 NOTE — TELEPHONE ENCOUNTER
Called and spoke to pt. Pt states he has a swollen gland in the right side of his neck under his jaw. Pt states gland swells randomly throughout the day. Scheduled pt appt tomorrow with CHRISTOPHER Bryant

## 2024-05-31 ENCOUNTER — OFFICE VISIT (OUTPATIENT)
Dept: INTERNAL MEDICINE | Facility: CLINIC | Age: 49
End: 2024-05-31
Payer: COMMERCIAL

## 2024-05-31 ENCOUNTER — HOSPITAL ENCOUNTER (OUTPATIENT)
Dept: RADIOLOGY | Facility: HOSPITAL | Age: 49
Discharge: HOME | End: 2024-05-31
Attending: NURSE PRACTITIONER
Payer: COMMERCIAL

## 2024-05-31 VITALS
HEART RATE: 53 BPM | TEMPERATURE: 97 F | HEIGHT: 69 IN | OXYGEN SATURATION: 97 % | SYSTOLIC BLOOD PRESSURE: 102 MMHG | BODY MASS INDEX: 37.65 KG/M2 | DIASTOLIC BLOOD PRESSURE: 68 MMHG | WEIGHT: 254.19 LBS

## 2024-05-31 DIAGNOSIS — E78.2 MIXED HYPERLIPIDEMIA: ICD-10-CM

## 2024-05-31 DIAGNOSIS — Z12.5 SCREENING FOR MALIGNANT NEOPLASM OF PROSTATE: ICD-10-CM

## 2024-05-31 DIAGNOSIS — R73.03 PREDIABETES: ICD-10-CM

## 2024-05-31 DIAGNOSIS — R59.9 SWOLLEN LYMPH NODES: ICD-10-CM

## 2024-05-31 DIAGNOSIS — R59.9 SWOLLEN LYMPH NODES: Primary | ICD-10-CM

## 2024-05-31 PROCEDURE — 3074F SYST BP LT 130 MM HG: CPT | Mod: CPTII,S$GLB,, | Performed by: NURSE PRACTITIONER

## 2024-05-31 PROCEDURE — 76536 US EXAM OF HEAD AND NECK: CPT | Mod: 26,,, | Performed by: RADIOLOGY

## 2024-05-31 PROCEDURE — 3078F DIAST BP <80 MM HG: CPT | Mod: CPTII,S$GLB,, | Performed by: NURSE PRACTITIONER

## 2024-05-31 PROCEDURE — 99214 OFFICE O/P EST MOD 30 MIN: CPT | Mod: S$GLB,,, | Performed by: NURSE PRACTITIONER

## 2024-05-31 PROCEDURE — 1160F RVW MEDS BY RX/DR IN RCRD: CPT | Mod: CPTII,S$GLB,, | Performed by: NURSE PRACTITIONER

## 2024-05-31 PROCEDURE — 99999 PR PBB SHADOW E&M-EST. PATIENT-LVL IV: CPT | Mod: PBBFAC,,, | Performed by: NURSE PRACTITIONER

## 2024-05-31 PROCEDURE — 1159F MED LIST DOCD IN RCRD: CPT | Mod: CPTII,S$GLB,, | Performed by: NURSE PRACTITIONER

## 2024-05-31 PROCEDURE — 76536 US EXAM OF HEAD AND NECK: CPT | Mod: TC,PO

## 2024-05-31 PROCEDURE — 3008F BODY MASS INDEX DOCD: CPT | Mod: CPTII,S$GLB,, | Performed by: NURSE PRACTITIONER

## 2024-05-31 RX ORDER — AMOXICILLIN AND CLAVULANATE POTASSIUM 875; 125 MG/1; MG/1
1 TABLET, FILM COATED ORAL EVERY 12 HOURS
Qty: 20 TABLET | Refills: 0 | Status: SHIPPED | OUTPATIENT
Start: 2024-05-31 | End: 2024-06-10

## 2024-05-31 NOTE — PROGRESS NOTES
"Subjective:       Patient ID: John Barthelemy is a 48 y.o. male.    Chief Complaint: Neck Pain    Pt presents to clinic today for swollen glands  Noticed the area of his right jaw to be swollen about 1 month ago  Feels it swells more with eating  Painful to touch  Can feel 1 area enlarging   Denies fever, chills, weight loss  No recent illnesses         /68   Pulse (!) 53   Temp 97.2 °F (36.2 °C)   Ht 5' 9" (1.753 m)   Wt 115.3 kg (254 lb 3.1 oz)   SpO2 97%   BMI 37.54 kg/m²     Review of Systems   Constitutional:  Negative for activity change, appetite change, chills, diaphoresis, fatigue, fever and unexpected weight change.   HENT: Negative.     Eyes: Negative.    Respiratory:  Negative for apnea, chest tightness, shortness of breath and stridor.    Cardiovascular:  Negative for chest pain, palpitations and leg swelling.   Gastrointestinal: Negative.    Endocrine: Negative.    Genitourinary: Negative.    Musculoskeletal:  Negative for arthralgias and myalgias.   Skin:  Negative for color change, pallor, rash and wound.   Allergic/Immunologic: Negative.    Neurological:  Negative for dizziness, facial asymmetry, light-headedness and headaches.   Hematological:  Negative for adenopathy.   Psychiatric/Behavioral:  Negative for agitation and behavioral problems.        Objective:      Physical Exam  Vitals and nursing note reviewed.   Constitutional:       General: He is not in acute distress.     Appearance: He is well-developed. He is not diaphoretic.   HENT:      Head: Normocephalic and atraumatic.   Neck:     Cardiovascular:      Rate and Rhythm: Normal rate and regular rhythm.      Heart sounds: Normal heart sounds.   Pulmonary:      Effort: Pulmonary effort is normal. No respiratory distress.      Breath sounds: Normal breath sounds.   Lymphadenopathy:      Cervical: Cervical adenopathy present.      Right cervical: Superficial cervical adenopathy present.   Skin:     General: Skin is warm and dry. "      Findings: No rash.   Neurological:      Mental Status: He is alert and oriented to person, place, and time.   Psychiatric:         Behavior: Behavior normal.         Thought Content: Thought content normal.         Judgment: Judgment normal.         Assessment:       1. Swollen lymph nodes    2. Mixed hyperlipidemia    3. Screening for malignant neoplasm of prostate    4. Prediabetes    5. BMI 37.0-37.9, adult        Plan:       Jens was seen today for neck pain.    Diagnoses and all orders for this visit:    Swollen lymph nodes  -     amoxicillin-clavulanate 875-125mg (AUGMENTIN) 875-125 mg per tablet; Take 1 tablet by mouth every 12 (twelve) hours. for 10 days  -     CBC Auto Differential; Future  -     US Soft Tissue Head Neck; Future    Mixed hyperlipidemia  -     Comprehensive Metabolic Panel; Future  -     Lipid Panel; Future    Screening for malignant neoplasm of prostate  -     PSA, Screening; Future    Prediabetes  -     Hemoglobin A1C; Future    BMI 37.0-37.9, adult      1 enlarged node to right jaw line  Will treat with Augmentin  Follow up in 2 weeks- if no improvement-- ENT for evaluation  Update labs  Follow up for worsening or no improvement in symptoms and PRN.

## 2024-06-10 NOTE — PROGRESS NOTES
Chief complaint:    Chief Complaint   Patient presents with    Thyroid Problem     Pt has come in due to swelling in the face after pt eats   Pt is able eat and drink with no issues   Pt has no reflux            Referring Provider:  Manny Winter Np  [address] [place] [address]      History of present illness:   Mr. Barthelemy is a 48 y.o. presenting for evaluation of neck mass.     He  has been referred by Dr. Winter.        He states that about  6 weeks ago he noted a mass located at right submandibular region. Since that time it has come and gone, but overall increasing in size. Swells more when he eats.  Denies pain.  Was given antibiotics with partial relief.      He denies sore throat, dysphagia, otalgia, voice change, hemoptysis. No facial weakness     No history of face/scalp/neck cutaneous cancers.      Workup thus far has included: US neck   Denies AI conditions, denies xerostomia, joint pain, kidney or lung disease.   Does have gout.         History      Past Medical History:   Past Medical History:   Diagnosis Date    Chronic left shoulder pain 7/17/2017    Gout          Past Surgical History:  Past Surgical History:   Procedure Laterality Date    COLONOSCOPY N/A 8/30/2023    Procedure: COLONOSCOPY;  Surgeon: Evy Ceja MD;  [facility]: [facility];  Service: Endoscopy;  Laterality: N/A;         Medications: Medication list is documented in [facility] and was reviewed. He  has a current medication list which includes the following prescription(s): atorvastatin, ciclopirox, colchicine, and indomethacin, and the following Facility-Administered Medications: sodium chloride 0.9% and lactated ringers.     Allergies:   Review of patient's allergies indicates:   Allergen Reactions    Betamethasone Other (See Comments)     Hiccups         Family history: family history includes Cancer in his paternal grandmother; Heart disease (age of onset: 39) in his father; No Known Problems in his mother and  "sister.         Social History          Alcohol use:  reports current alcohol use of about 6.0 standard drinks of alcohol per week.            Tobacco:  reports that he has never smoked. He has never used smokeless tobacco.         Physical Examination      Vitals: Height 5' 9" (1.753 m), weight 115.3 kg (254 lb 3.1 oz).       General appearance of patient: healthy and no distress       Quality of voice: no dysphonia, no dysarthria       Inspection of head and face: Normocephalic, without obvious abnormality, sinuses nontender to percussion       Bilateral parotid glands soft, nontender, no swelling, no masses/lesions       Facial strength: intact, symmetric       Extraocular movements intact       Nose: external nose without external deformity, nasal mucosa normal, septum midline       Ear canals, external ears, TMs: normal bilaterally       Oral cavity: tongue mobile, FOM soft, mucosa healthy throughout with no masses, lesions, ulcerations       Oropharynx: mucosa of tonsillar fossae healthy; soft palate/uvula intact, mobile, mucosa healthy; tongue base soft, nontender; no mucosal ulcerations or masses or other worrisome lesions      Neck: soft, supple, no masses or palpable lymph nodes. No masses of SMG, soft, non-tender, minimal salivary flow from right SMG duct      Thyroid: normal size, nontender, no nodules palpable          Data reviewed      Review of records:      I reviewed records from the referring provider's office visits, including the history, workup, and/or treatment of this problem thus far.      Laboratory:           Component Ref Range & Units 11 d ago 9 mo ago 11 mo ago   WBC 3.90 - 12.70 K/uL 5.85 11.28 7.34   RBC 4.60 - 6.20 M/uL 4.81 5.04 4.87   Hemoglobin 14.0 - 18.0 g/dL 14.7 14.7 14.4   Hematocrit 40.0 - 54.0 % 44.1 45.2 46.4   MCV 82 - 98 fL 92 90 95   MCH 27.0 - 31.0 pg 30.6 29.2 29.6   MCHC 32.0 - 36.0 g/dL 33.3 32.5 31.0 Low    RDW 11.5 - 14.5 % 13.2 13.1 13.2   Platelets 150 - 450 " K/uL 317 419 354   MPV 9.2 - 12.9 fL 10.3 9.9 10.4   Immature Granulocytes 0.0 - 0.5 % 0.3 0.4 0.4   Gran # (ANC) 1.8 - 7.7 K/uL 3.0 9.4 High  3.7   Immature Grans (Abs) 0.00 - 0.04 K/uL 0.02 0.04 CM 0.03 C          Imaging:      I have independently reviewed the following imaging with the findings noted below:        US neck 5/31/24  Abnormal appearance of the right submandibular gland, the area of patient's palpable concern.  Differential diagnosis includes chronic sialadenitis, Sjogren syndrome, and granulomatous disease.  No discrete lesion to suggest neoplasm however neoplastic process is not entirely excluded.  Consider follow-up with ENT.     left        Right            Assessment/Plan:    1. Sialadenitis    2. Swollen lymph nodes    3. Submandibular swelling        -    Sialoadenitis (salivary gland inflammation) - Jens has Subacute right submandibular gland inflammation.    He needs to stay very well hydrated. He can bring water bottles with him to drink throughout the day.  The goal is to hydrate until his urine is pale / clear.   He should avoid caffeine (in coffee, tea, soda) which causes dehydration.  Some medications, such as allergy medications or diuretics, can cause dryness.     Bacterial infections can arise and are more likely with blockage (such as salivary duct stones), poor oral hygiene, smokers, or dehydration. Infection may require antibiotic therapy.  Sialogogues (such as sugar-free lemon drops) will increase the flow of saliva and reduce swelling. Also, regular, gentle gland massage and applying warm compress may help. Analgesics (mainly NSAIDs such as ibuprofen) can alleviate discomfort.      Due to severity and frequency we will move forward with CT neck. We discussed treatment options based ons can incldued conservative measures, sialendoscopy, SMG excision, or stone removal from floor of mouth based on location of obstructing pathology.      Quinten Owens MD  [facility]   [facility]  [address]  [place] [address]  P: [phone]  F: [phone]

## 2024-06-11 ENCOUNTER — OFFICE VISIT (OUTPATIENT)
Dept: OTOLARYNGOLOGY | Facility: CLINIC | Age: 49
End: 2024-06-11
Payer: COMMERCIAL

## 2024-06-11 VITALS — WEIGHT: 254.19 LBS | BODY MASS INDEX: 37.65 KG/M2 | HEIGHT: 69 IN

## 2024-06-11 DIAGNOSIS — R22.0 SUBMANDIBULAR SWELLING: ICD-10-CM

## 2024-06-11 DIAGNOSIS — R22.1 SUBMANDIBULAR SWELLING: ICD-10-CM

## 2024-06-11 DIAGNOSIS — R59.9 SWOLLEN LYMPH NODES: ICD-10-CM

## 2024-06-11 DIAGNOSIS — K11.20 SIALADENITIS: Primary | ICD-10-CM

## 2024-06-11 PROCEDURE — 3008F BODY MASS INDEX DOCD: CPT | Mod: CPTII,S$GLB,, | Performed by: STUDENT IN AN ORGANIZED HEALTH CARE EDUCATION/TRAINING PROGRAM

## 2024-06-11 PROCEDURE — 1159F MED LIST DOCD IN RCRD: CPT | Mod: CPTII,S$GLB,, | Performed by: STUDENT IN AN ORGANIZED HEALTH CARE EDUCATION/TRAINING PROGRAM

## 2024-06-11 PROCEDURE — 99999 PR PBB SHADOW E&M-EST. PATIENT-LVL III: CPT | Mod: PBBFAC,,, | Performed by: STUDENT IN AN ORGANIZED HEALTH CARE EDUCATION/TRAINING PROGRAM

## 2024-06-11 PROCEDURE — 3044F HG A1C LEVEL LT 7.0%: CPT | Mod: CPTII,S$GLB,, | Performed by: STUDENT IN AN ORGANIZED HEALTH CARE EDUCATION/TRAINING PROGRAM

## 2024-06-11 PROCEDURE — 99204 OFFICE O/P NEW MOD 45 MIN: CPT | Mod: S$GLB,,, | Performed by: STUDENT IN AN ORGANIZED HEALTH CARE EDUCATION/TRAINING PROGRAM

## 2024-06-12 ENCOUNTER — HOSPITAL ENCOUNTER (OUTPATIENT)
Dept: RADIOLOGY | Facility: HOSPITAL | Age: 49
Discharge: HOME | End: 2024-06-12
Attending: STUDENT IN AN ORGANIZED HEALTH CARE EDUCATION/TRAINING PROGRAM
Payer: COMMERCIAL

## 2024-06-12 DIAGNOSIS — R22.1 SUBMANDIBULAR SWELLING: ICD-10-CM

## 2024-06-12 DIAGNOSIS — R22.0 SUBMANDIBULAR SWELLING: ICD-10-CM

## 2024-06-12 DIAGNOSIS — K11.20 SIALADENITIS: ICD-10-CM

## 2024-06-12 PROCEDURE — 70492 CT SFT TSUE NCK W/O & W/DYE: CPT | Mod: 26,,, | Performed by: RADIOLOGY

## 2024-06-12 PROCEDURE — 25500020 PHARM REV CODE 255: Mod: PN | Performed by: STUDENT IN AN ORGANIZED HEALTH CARE EDUCATION/TRAINING PROGRAM

## 2024-06-12 PROCEDURE — 70492 CT SFT TSUE NCK W/O & W/DYE: CPT | Mod: TC,PN

## 2024-06-12 RX ADMIN — IOHEXOL 75 ML: 350 INJECTION, SOLUTION INTRAVENOUS at 01:06

## 2024-07-12 ENCOUNTER — PATIENT MESSAGE (OUTPATIENT)
Dept: OTOLARYNGOLOGY | Facility: CLINIC | Age: 49
End: 2024-07-12
Payer: COMMERCIAL

## 2024-07-12 ENCOUNTER — PATIENT MESSAGE (OUTPATIENT)
Dept: INTERNAL MEDICINE | Facility: CLINIC | Age: 49
End: 2024-07-12
Payer: COMMERCIAL

## 2024-07-16 ENCOUNTER — ON-DEMAND VIRTUAL (OUTPATIENT)
Dept: URGENT CARE | Facility: CLINIC | Age: 49
End: 2024-07-16
Payer: COMMERCIAL

## 2024-07-16 DIAGNOSIS — M10.9 ACUTE GOUT INVOLVING TOE OF RIGHT FOOT, UNSPECIFIED CAUSE: Primary | ICD-10-CM

## 2024-07-16 PROCEDURE — 99213 OFFICE O/P EST LOW 20 MIN: CPT | Mod: 95,,, | Performed by: NURSE PRACTITIONER

## 2024-07-16 RX ORDER — INDOMETHACIN 50 MG/1
50 CAPSULE ORAL
Qty: 30 CAPSULE | Refills: 0 | Status: SHIPPED | OUTPATIENT
Start: 2024-07-16

## 2024-07-16 RX ORDER — COLCHICINE 0.6 MG/1
TABLET ORAL
Qty: 20 TABLET | Refills: 0 | Status: SHIPPED | OUTPATIENT
Start: 2024-07-16

## 2024-07-17 NOTE — PROGRESS NOTES
Subjective:      Patient ID: John Barthelemy is a 49 y.o. male.    Vitals:  vitals were not taken for this visit.     Chief Complaint: No chief complaint on file.      Visit Type: TELE AUDIOVISUAL    Present with the patient at the time of consultation: TELEMED PRESENT WITH PATIENT: family member    Past Medical History:   Diagnosis Date    Chronic left shoulder pain 7/17/2017    Gout      Past Surgical History:   Procedure Laterality Date    COLONOSCOPY N/A 8/30/2023    Procedure: COLONOSCOPY;  Surgeon: Evy Ceja MD;  Location: [facility];  Service: Endoscopy;  Laterality: N/A;     Review of patient's allergies indicates:   Allergen Reactions    Betamethasone Other (See Comments)     Hiccups     Current Outpatient Medications on File Prior to Visit   Medication Sig Dispense Refill    atorvastatin (LIPITOR) 20 MG tablet Take 1 tablet (20 mg total) by mouth once daily. 90 tablet 4    ciclopirox 0.77 % Gel Apply topically 2 (two) times daily. To toe webspaces 30 g 0     Current Facility-Administered Medications on File Prior to Visit   Medication Dose Route Frequency Provider Last Rate Last Admin    0.9%  NaCl infusion   Intravenous Continuous Minoo Glynn MD        lactated ringers infusion   Intravenous Continuous Minoo Glynn MD 10 mL/hr at 08/30/23 1055 Restarted at 08/30/23 1223     Family History   Problem Relation Name Age of Onset    No Known Problems Mother      Heart disease Father  39        Bypass    No Known Problems Sister      Cancer Paternal Grandmother         Medications Ordered                [id number] [place] [id number] [place] [id number] AT [facility] [address] [facility]   [address] [address] [address]    Telephone: [phone]   Fax: [phone]   Hours: Not open 24 hours                         E-Prescribed (2 of 2)              colchicine (COLCRYS) 0.6 mg tablet    Sig: Day 1 - Take two tabs PO now, repeat one tab in 1 hour. Day 2 and thereafter-  Then one tab daily until 48 hours after flare resolves.       Start: 7/16/24     Quantity: 20 tablet Refills: 0                         indomethacin (INDOCIN) 50 MG capsule    Sig: Take 1 capsule (50 mg total) by mouth every meal as needed (GOUT FLARE). Continue to take for few days after flare resolves.       Start: 7/16/24     Quantity: 30 capsule Refills: 0                           Ohs Peq Odvv Intake    7/16/2024 10:29 PM CDT - Filed by Patient   What is your current physical address in the event of a medical emergency? [address]   Are you able to take your vital signs? No   Please attach any relevant images or files          49 year old male who calls in today with complaints of a gout flare that started yesterday.   He states he did eat some seafood and has started with right big toe pain. No recent fever. He is out of his gout medication.   No recent injury or trauma.       ROS     Objective:   The physical exam was conducted virtually.  Physical Exam   Constitutional: He is oriented to person, place, and time. No distress.   HENT:   Head: Normocephalic and atraumatic.   Mouth/Throat: Oropharynx is clear and moist and mucous membranes are normal.   Eyes: Conjunctivae are normal. No scleral icterus.   Pulmonary/Chest: Effort normal. No respiratory distress.   Musculoskeletal: Normal range of motion.         General: Normal range of motion.   Neurological: He is alert and oriented to person, place, and time.   Skin: Skin is not diaphoretic.   Psychiatric: His behavior is normal. Judgment and thought content normal.   Vitals reviewed.      Assessment:     1. Acute gout involving toe of right foot, unspecified cause        Plan:       Acute gout involving toe of right foot, unspecified cause  -     indomethacin (INDOCIN) 50 MG capsule; Take 1 capsule (50 mg total) by mouth every meal as needed (GOUT FLARE). Continue to take for few days after flare resolves.  Dispense: 30 capsule; Refill: 0  -      colchicine (COLCRYS) 0.6 mg tablet; Day 1 - Take two tabs PO now, repeat one tab in 1 hour. Day 2 and thereafter- Then one tab daily until 48 hours after flare resolves.  Dispense: 20 tablet; Refill: 0                  Thank you for choosing [facility]!    Our goal in the [facility] is to always provide outstanding medical care. You may receive a survey by mail or e-mail in the next week regarding your experience today. We would greatly appreciate you completing and returning the survey. Your feedback provides us with a way to recognize our staff who provide very good care, and it helps us learn how to improve when your experience was below our aspiration of excellence.         We appreciate you trusting us with your medical care. We hope you feel better soon. We will be happy to take care of you for all of your future medical needs.    You must understand that you've received an Urgent Care treatment only and that you may be released before all your medical problems are known or treated. You, the patient, will arrange for follow up care as instructed.    Follow up with your PCP or specialty clinic as directed in the next 1-2 weeks if not improved or as needed.  You can call [phone] to schedule an appointment with the appropriate provider.    If your condition worsens we recommend that you receive another evaluation in person, with your primary care provider, urgent care or at the emergency room immediately or contact your primary medical clinics after hours call service to discuss your concerns.

## 2024-07-22 DIAGNOSIS — R22.0 SUBMANDIBULAR SWELLING: ICD-10-CM

## 2024-07-22 DIAGNOSIS — K11.20 SIALADENITIS: Primary | ICD-10-CM

## 2024-07-22 DIAGNOSIS — R22.1 SUBMANDIBULAR SWELLING: ICD-10-CM

## 2024-08-15 ENCOUNTER — OFFICE VISIT (OUTPATIENT)
Dept: OTOLARYNGOLOGY | Facility: CLINIC | Age: 49
End: 2024-08-15
Payer: COMMERCIAL

## 2024-08-15 DIAGNOSIS — K11.20 SIALADENITIS: Primary | ICD-10-CM

## 2024-08-15 PROCEDURE — 1159F MED LIST DOCD IN RCRD: CPT | Mod: CPTII,S$GLB,, | Performed by: OTOLARYNGOLOGY

## 2024-08-15 PROCEDURE — 99999 PR PBB SHADOW E&M-EST. PATIENT-LVL III: CPT | Mod: PBBFAC,,, | Performed by: OTOLARYNGOLOGY

## 2024-08-15 PROCEDURE — 1160F RVW MEDS BY RX/DR IN RCRD: CPT | Mod: CPTII,S$GLB,, | Performed by: OTOLARYNGOLOGY

## 2024-08-15 PROCEDURE — 99214 OFFICE O/P EST MOD 30 MIN: CPT | Mod: S$GLB,,, | Performed by: OTOLARYNGOLOGY

## 2024-08-15 PROCEDURE — 3044F HG A1C LEVEL LT 7.0%: CPT | Mod: CPTII,S$GLB,, | Performed by: OTOLARYNGOLOGY

## 2024-08-15 RX ORDER — SODIUM CHLORIDE 0.9 % (FLUSH) 0.9 %
10 SYRINGE (ML) INJECTION
OUTPATIENT
Start: 2024-08-15

## 2024-08-15 RX ORDER — LIDOCAINE HYDROCHLORIDE 10 MG/ML
1 INJECTION, SOLUTION EPIDURAL; INFILTRATION; INTRACAUDAL; PERINEURAL ONCE
OUTPATIENT
Start: 2024-08-15 | End: 2024-08-15

## 2024-08-15 NOTE — PROGRESS NOTES
Chief Complaint   Patient presents with    swollen rt salivary gland       HPI   49 y.o. male presents for evaluation of recent history of right submandibular sialoadenitis.  He reports pain and swelling of the gland which occurs with every meal.  He has been treated with antibiotics with little durable relief.  No other complaints.    Review of Systems   Constitutional: Negative for fatigue and unexpected weight change.   HENT: Per HPI.  Eyes: Negative for visual disturbance.   Respiratory: Negative for shortness of breath, hemoptysis   Cardiovascular: Negative for chest pain and palpitations.   Musculoskeletal: Negative for decreased ROM, back pain.   Skin: Negative for rash, sunburn, itching.   Neurological: Negative for dizziness and seizures.   Hematological: Negative for adenopathy. Does not bruise/bleed easily.   Endocrine: Negative for rapid weight loss/weight gain, heat/cold intolerance.     Past Medical History   Patient Active Problem List   Diagnosis    Gout    Severe obesity (BMI 35.0-39.9) with comorbidity    Encounter for screening colonoscopy    Sialadenitis           Past Surgical History   Past Surgical History:   Procedure Laterality Date    COLONOSCOPY N/A 8/30/2023    Procedure: COLONOSCOPY;  Surgeon: Evy Ceja MD;  Location: [facility];  Service: Endoscopy;  Laterality: N/A;         Family History   Family History   Problem Relation Name Age of Onset    No Known Problems Mother      Heart disease Father  39        Bypass    No Known Problems Sister      Cancer Paternal Grandmother             Social History   .  Social History     Socioeconomic History    Marital status:     Number of children: 2   Occupational History    Occupation: Educator   Tobacco Use    Smoking status: Never    Smokeless tobacco: Never   Substance and Sexual Activity    Alcohol use: Yes     Alcohol/week: 6.0 standard drinks of alcohol     Types: 6 Cans of beer per week     Comment: socially     Drug use:  No    Sexual activity: Yes     Partners: Female     Social Determinants of Health     Financial Resource Strain: Low Risk  (5/31/2024)    Overall Financial Resource Strain (CARDIA)     Difficulty of Paying Living Expenses: Not very hard   Food Insecurity: Food Insecurity Present (5/31/2024)    Hunger Vital Sign     Worried About Running Out of Food in the Last Year: Sometimes true     Ran Out of Food in the Last Year: Sometimes true   Physical Activity: Insufficiently Active (5/31/2024)    Exercise Vital Sign     Days of Exercise per Week: 1 day     Minutes of Exercise per Session: 100 min   Stress: Stress Concern Present (5/31/2024)    [employer] [place] [employer] - Occupational Stress Questionnaire     Feeling of Stress : To some extent   Housing Stability: Unknown (5/31/2024)    Housing Stability Vital Sign     Unable to Pay for Housing in the Last Year: No         Allergies   Review of patient's allergies indicates:   Allergen Reactions    Betamethasone Other (See Comments)     Hiccups           Physical Exam     There were no vitals filed for this visit.      There is no height or weight on file to calculate BMI.      General: AOx3, NAD   Respiratory:  Symmetric chest rise, normal effort  Oral Cavity:  Oral Tongue mobile, no lesions noted. Hard Palate WNL. No buccal or FOM lesions.  Oropharynx:  No masses/lesions of the posterior pharyngeal wall. Tonsillar fossa without lesions. Soft palate without masses. Midline uvula.   Neck: No scars.  No cervical lymphadenopathy, thyromegaly or thyroid nodules.  Normal range of motion.  Right submandibular gland slightly enlarged relative to left.  Tender to palpation.  Face: House Brackmann I bilaterally.     CT neck reviewed.    Assessment/Plan  Problem List Items Addressed This Visit          ENT    Sialadenitis - Primary     Right submandibular sialoadenitis.  Etiology uncertain.  Skin without evidence of stones.  I offered him the options of continued  conservative management versus salivary endoscopy versus submandibular gland resection.  He has elected to proceed with salivary endoscopy on 08/28/2024.

## 2024-08-15 NOTE — ASSESSMENT & PLAN NOTE
Right submandibular sialoadenitis.  Etiology uncertain.  Skin without evidence of stones.  I offered him the options of continued conservative management versus salivary endoscopy versus submandibular gland resection.  He has elected to proceed with salivary endoscopy on 08/28/2024.

## 2024-08-15 NOTE — H&P (VIEW-ONLY)
Chief Complaint   Patient presents with    swollen rt salivary gland       HPI   49 y.o. male presents for evaluation of recent history of right submandibular sialoadenitis.  He reports pain and swelling of the gland which occurs with every meal.  He has been treated with antibiotics with little durable relief.  No other complaints.    Review of Systems   Constitutional: Negative for fatigue and unexpected weight change.   HENT: Per HPI.  Eyes: Negative for visual disturbance.   Respiratory: Negative for shortness of breath, hemoptysis   Cardiovascular: Negative for chest pain and palpitations.   Musculoskeletal: Negative for decreased ROM, back pain.   Skin: Negative for rash, sunburn, itching.   Neurological: Negative for dizziness and seizures.   Hematological: Negative for adenopathy. Does not bruise/bleed easily.   Endocrine: Negative for rapid weight loss/weight gain, heat/cold intolerance.     Past Medical History   Patient Active Problem List   Diagnosis    Gout    Severe obesity (BMI 35.0-39.9) with comorbidity    Encounter for screening colonoscopy    Sialadenitis           Past Surgical History   Past Surgical History:   Procedure Laterality Date    COLONOSCOPY N/A 8/30/2023    Procedure: COLONOSCOPY;  Surgeon: Evy Ceja MD;  Location: [facility];  Service: Endoscopy;  Laterality: N/A;         Family History   Family History   Problem Relation Name Age of Onset    No Known Problems Mother      Heart disease Father  39        Bypass    No Known Problems Sister      Cancer Paternal Grandmother             Social History   .  Social History     Socioeconomic History    Marital status:     Number of children: 2   Occupational History    Occupation: Educator   Tobacco Use    Smoking status: Never    Smokeless tobacco: Never   Substance and Sexual Activity    Alcohol use: Yes     Alcohol/week: 6.0 standard drinks of alcohol     Types: 6 Cans of beer per week     Comment: socially     Drug use:  No    Sexual activity: Yes     Partners: Female     Social Determinants of Health     Financial Resource Strain: Low Risk  (5/31/2024)    Overall Financial Resource Strain (CARDIA)     Difficulty of Paying Living Expenses: Not very hard   Food Insecurity: Food Insecurity Present (5/31/2024)    Hunger Vital Sign     Worried About Running Out of Food in the Last Year: Sometimes true     Ran Out of Food in the Last Year: Sometimes true   Physical Activity: Insufficiently Active (5/31/2024)    Exercise Vital Sign     Days of Exercise per Week: 1 day     Minutes of Exercise per Session: 100 min   Stress: Stress Concern Present (5/31/2024)    [employer] [place] [employer] - Occupational Stress Questionnaire     Feeling of Stress : To some extent   Housing Stability: Unknown (5/31/2024)    Housing Stability Vital Sign     Unable to Pay for Housing in the Last Year: No         Allergies   Review of patient's allergies indicates:   Allergen Reactions    Betamethasone Other (See Comments)     Hiccups           Physical Exam     There were no vitals filed for this visit.      There is no height or weight on file to calculate BMI.      General: AOx3, NAD   Respiratory:  Symmetric chest rise, normal effort  Oral Cavity:  Oral Tongue mobile, no lesions noted. Hard Palate WNL. No buccal or FOM lesions.  Oropharynx:  No masses/lesions of the posterior pharyngeal wall. Tonsillar fossa without lesions. Soft palate without masses. Midline uvula.   Neck: No scars.  No cervical lymphadenopathy, thyromegaly or thyroid nodules.  Normal range of motion.  Right submandibular gland slightly enlarged relative to left.  Tender to palpation.  Face: House Brackmann I bilaterally.     CT neck reviewed.    Assessment/Plan  Problem List Items Addressed This Visit          ENT    Sialadenitis - Primary     Right submandibular sialoadenitis.  Etiology uncertain.  Skin without evidence of stones.  I offered him the options of continued  conservative management versus salivary endoscopy versus submandibular gland resection.  He has elected to proceed with salivary endoscopy on 08/28/2024.

## 2024-08-15 NOTE — ANESTHESIA PAT ROS NOTE
08/15/2024  John Barthelemy is a 49 y.o., male.      Pre-op Assessment          Review of Systems  Anesthesia Hx:   History of prior surgery of interest to airway management or planning:  Previous anesthesia: General COLONOSCOPY (Abdomen) 8/30/23 with general anesthesia.  Procedure performed at an [facility].          Social:  Non-Smoker, Alcohol Use       EENT/Dental:   SIALADENITIS          Cardiovascular:                hyperlipidemia                             Musculoskeletal:     Joint Disease:  Gout          Endocrine:        Obesity / BMI > 30           Anesthesia Assessment: Preoperative EQUATION    Planned Procedure: Procedure(s) (LRB):  SIALENDOSCOPY (Right)  Requested Anesthesia Type:General  Surgeon: Evert Johnson MD  Service: ENT  Known or anticipated Date of Surgery:8/28/2024    Surgeon notes: reviewed    Electronic QUestionnaire Assessment completed via nurse interview with patient.        Triage considerations:     The patient has no apparent active cardiac condition (No unstable coronary Syndrome such as severe unstable angina or recent [<1 month] myocardial infarction, decompensated CHF, severe valvular   disease or significant arrhythmia)    Previous anesthesia records:No problems and GENERAL NATURAL AIRWAY    Airway:  Mallampati: II   Mouth Opening: Normal  TM Distance: Normal  Tongue: Normal  Neck ROM: Normal ROM    Last PCP note: within 3 months , within [facility]   Subspecialty notes: ENT    Other important co-morbidities: HLD and Obesity      Tests already available:  Available tests,  within 3 months , within [facility] .     6/12/24  CT NECK    5/31/24  PSA, A1C, LIPID PANEL, CMP, CBC            Instructions given. (See in Nurse's note)    Optimization:  Anesthesia Preop Clinic Assessment NOT  Indicated    Medical Opinion NOT Indicated              Plan:    Testing:  None  Needed        Patient  has previously scheduled Medical Appointment: NOT AT THIS TIME    Navigation: Straight Line to surgery.               No tests, anesthesia preop clinic visit, or consult required.

## 2024-08-27 ENCOUNTER — ANESTHESIA EVENT (OUTPATIENT)
Dept: SURGERY | Facility: HOSPITAL | Age: 49
End: 2024-08-27
Payer: COMMERCIAL

## 2024-08-27 ENCOUNTER — PATIENT MESSAGE (OUTPATIENT)
Dept: OTOLARYNGOLOGY | Facility: CLINIC | Age: 49
End: 2024-08-27
Payer: COMMERCIAL

## 2024-08-28 ENCOUNTER — HOSPITAL ENCOUNTER (OUTPATIENT)
Facility: HOSPITAL | Age: 49
Discharge: HOME | End: 2024-08-28
Attending: OTOLARYNGOLOGY | Admitting: OTOLARYNGOLOGY
Payer: COMMERCIAL

## 2024-08-28 ENCOUNTER — ANESTHESIA (OUTPATIENT)
Dept: SURGERY | Facility: HOSPITAL | Age: 49
End: 2024-08-28
Payer: COMMERCIAL

## 2024-08-28 VITALS
BODY MASS INDEX: 38.21 KG/M2 | RESPIRATION RATE: 18 BRPM | TEMPERATURE: 98 F | HEART RATE: 54 BPM | DIASTOLIC BLOOD PRESSURE: 67 MMHG | SYSTOLIC BLOOD PRESSURE: 109 MMHG | OXYGEN SATURATION: 96 % | HEIGHT: 69 IN | WEIGHT: 258 LBS

## 2024-08-28 DIAGNOSIS — K11.20 SIALADENITIS: ICD-10-CM

## 2024-08-28 PROCEDURE — 37000009 HC ANESTHESIA EA ADD 15 MINS: Performed by: OTOLARYNGOLOGY

## 2024-08-28 PROCEDURE — 37000008 HC ANESTHESIA 1ST 15 MINUTES: Performed by: OTOLARYNGOLOGY

## 2024-08-28 PROCEDURE — 36000706: Performed by: OTOLARYNGOLOGY

## 2024-08-28 PROCEDURE — 25000003 PHARM REV CODE 250: Performed by: STUDENT IN AN ORGANIZED HEALTH CARE EDUCATION/TRAINING PROGRAM

## 2024-08-28 PROCEDURE — 63600175 PHARM REV CODE 636 W HCPCS: Performed by: STUDENT IN AN ORGANIZED HEALTH CARE EDUCATION/TRAINING PROGRAM

## 2024-08-28 PROCEDURE — 71000015 HC POSTOP RECOV 1ST HR: Performed by: OTOLARYNGOLOGY

## 2024-08-28 PROCEDURE — 63600175 PHARM REV CODE 636 W HCPCS: Performed by: OTOLARYNGOLOGY

## 2024-08-28 PROCEDURE — 71000044 HC DOSC ROUTINE RECOVERY FIRST HOUR: Performed by: OTOLARYNGOLOGY

## 2024-08-28 PROCEDURE — 25000003 PHARM REV CODE 250: Performed by: OTOLARYNGOLOGY

## 2024-08-28 PROCEDURE — 25000003 PHARM REV CODE 250

## 2024-08-28 PROCEDURE — 42699 UNLISTED PX SALIVRY GLND/DUX: CPT | Mod: ,,, | Performed by: OTOLARYNGOLOGY

## 2024-08-28 PROCEDURE — 63600175 PHARM REV CODE 636 W HCPCS

## 2024-08-28 PROCEDURE — 36000707: Performed by: OTOLARYNGOLOGY

## 2024-08-28 RX ORDER — ROCURONIUM BROMIDE 10 MG/ML
INJECTION, SOLUTION INTRAVENOUS
Status: DISCONTINUED | OUTPATIENT
Start: 2024-08-28 | End: 2024-08-28

## 2024-08-28 RX ORDER — ONDANSETRON HYDROCHLORIDE 2 MG/ML
INJECTION, SOLUTION INTRAVENOUS
Status: DISCONTINUED | OUTPATIENT
Start: 2024-08-28 | End: 2024-08-28

## 2024-08-28 RX ORDER — OXYCODONE HYDROCHLORIDE 5 MG/1
5 TABLET ORAL
Status: DISCONTINUED | OUTPATIENT
Start: 2024-08-28 | End: 2024-08-28 | Stop reason: HOSPADM

## 2024-08-28 RX ORDER — HYDROMORPHONE HYDROCHLORIDE 1 MG/ML
0.2 INJECTION, SOLUTION INTRAMUSCULAR; INTRAVENOUS; SUBCUTANEOUS EVERY 5 MIN PRN
Status: DISCONTINUED | OUTPATIENT
Start: 2024-08-28 | End: 2024-08-28 | Stop reason: HOSPADM

## 2024-08-28 RX ORDER — SODIUM CHLORIDE 0.9 % (FLUSH) 0.9 %
10 SYRINGE (ML) INJECTION
Status: DISCONTINUED | OUTPATIENT
Start: 2024-08-28 | End: 2024-08-28 | Stop reason: HOSPADM

## 2024-08-28 RX ORDER — GLUCAGON 1 MG
1 KIT INJECTION
Status: DISCONTINUED | OUTPATIENT
Start: 2024-08-28 | End: 2024-08-28 | Stop reason: HOSPADM

## 2024-08-28 RX ORDER — DEXAMETHASONE SODIUM PHOSPHATE 4 MG/ML
INJECTION, SOLUTION INTRA-ARTICULAR; INTRALESIONAL; INTRAMUSCULAR; INTRAVENOUS; SOFT TISSUE
Status: DISCONTINUED | OUTPATIENT
Start: 2024-08-28 | End: 2024-08-28

## 2024-08-28 RX ORDER — HALOPERIDOL 5 MG/ML
0.5 INJECTION INTRAMUSCULAR EVERY 10 MIN PRN
Status: DISCONTINUED | OUTPATIENT
Start: 2024-08-28 | End: 2024-08-28 | Stop reason: HOSPADM

## 2024-08-28 RX ORDER — PROPOFOL 10 MG/ML
VIAL (ML) INTRAVENOUS
Status: DISCONTINUED | OUTPATIENT
Start: 2024-08-28 | End: 2024-08-28

## 2024-08-28 RX ORDER — LIDOCAINE HYDROCHLORIDE 10 MG/ML
1 INJECTION, SOLUTION EPIDURAL; INFILTRATION; INTRACAUDAL; PERINEURAL ONCE
Status: DISCONTINUED | OUTPATIENT
Start: 2024-08-28 | End: 2024-08-28 | Stop reason: HOSPADM

## 2024-08-28 RX ORDER — ACETAMINOPHEN 500 MG
1000 TABLET ORAL
Status: COMPLETED | OUTPATIENT
Start: 2024-08-28 | End: 2024-08-28

## 2024-08-28 RX ORDER — LIDOCAINE HYDROCHLORIDE 20 MG/ML
INJECTION INTRAVENOUS
Status: DISCONTINUED | OUTPATIENT
Start: 2024-08-28 | End: 2024-08-28

## 2024-08-28 RX ORDER — MIDAZOLAM HYDROCHLORIDE 1 MG/ML
INJECTION INTRAMUSCULAR; INTRAVENOUS
Status: DISCONTINUED | OUTPATIENT
Start: 2024-08-28 | End: 2024-08-28

## 2024-08-28 RX ORDER — FENTANYL CITRATE 50 UG/ML
INJECTION, SOLUTION INTRAMUSCULAR; INTRAVENOUS
Status: DISCONTINUED | OUTPATIENT
Start: 2024-08-28 | End: 2024-08-28

## 2024-08-28 RX ORDER — HYDROCODONE BITARTRATE AND ACETAMINOPHEN 5; 325 MG/1; MG/1
1 TABLET ORAL EVERY 6 HOURS PRN
Qty: 5 TABLET | Refills: 0 | Status: SHIPPED | OUTPATIENT
Start: 2024-08-28

## 2024-08-28 RX ORDER — DEXMEDETOMIDINE HYDROCHLORIDE 100 UG/ML
INJECTION, SOLUTION INTRAVENOUS
Status: DISCONTINUED | OUTPATIENT
Start: 2024-08-28 | End: 2024-08-28

## 2024-08-28 RX ADMIN — HYDROMORPHONE HYDROCHLORIDE 0.2 MG: 1 INJECTION, SOLUTION INTRAMUSCULAR; INTRAVENOUS; SUBCUTANEOUS at 07:08

## 2024-08-28 RX ADMIN — ROCURONIUM BROMIDE 50 MG: 10 INJECTION, SOLUTION INTRAVENOUS at 05:08

## 2024-08-28 RX ADMIN — DEXMEDETOMIDINE 8 MCG: 100 INJECTION, SOLUTION, CONCENTRATE INTRAVENOUS at 06:08

## 2024-08-28 RX ADMIN — MIDAZOLAM HYDROCHLORIDE 2 MG: 1 INJECTION, SOLUTION INTRAMUSCULAR; INTRAVENOUS at 05:08

## 2024-08-28 RX ADMIN — FENTANYL CITRATE 100 MCG: 50 INJECTION, SOLUTION INTRAMUSCULAR; INTRAVENOUS at 05:08

## 2024-08-28 RX ADMIN — DEXMEDETOMIDINE 4 MCG: 100 INJECTION, SOLUTION, CONCENTRATE INTRAVENOUS at 06:08

## 2024-08-28 RX ADMIN — LIDOCAINE HYDROCHLORIDE 100 MG: 20 INJECTION INTRAVENOUS at 05:08

## 2024-08-28 RX ADMIN — ONDANSETRON 4 MG: 2 INJECTION INTRAMUSCULAR; INTRAVENOUS at 05:08

## 2024-08-28 RX ADMIN — CEFAZOLIN 2 G: 2 INJECTION, POWDER, FOR SOLUTION INTRAMUSCULAR; INTRAVENOUS at 05:08

## 2024-08-28 RX ADMIN — DEXAMETHASONE SODIUM PHOSPHATE 4 MG: 4 INJECTION, SOLUTION INTRAMUSCULAR; INTRAVENOUS at 05:08

## 2024-08-28 RX ADMIN — PROPOFOL 200 MG: 10 INJECTION, EMULSION INTRAVENOUS at 05:08

## 2024-08-28 RX ADMIN — SODIUM CHLORIDE: 9 INJECTION, SOLUTION INTRAVENOUS at 05:08

## 2024-08-28 RX ADMIN — ACETAMINOPHEN 1000 MG: 500 TABLET ORAL at 03:08

## 2024-08-28 RX ADMIN — SUGAMMADEX 200 MG: 100 INJECTION, SOLUTION INTRAVENOUS at 06:08

## 2024-08-28 NOTE — ANESTHESIA PREPROCEDURE EVALUATION
[facility]  Anesthesia Pre-Operative Evaluation         Patient Name: John Barthelemy  YOB: 1975  MRN: [id number]    SUBJECTIVE:     Pre-operative evaluation for Procedure(s) (LRB):  SIALENDOSCOPY (Right)     08/27/2024    John Barthelemy is a 49 y.o. male w/ a significant PMHx of morbid obesity, gout, and sialadenitis who is presenting for surgical evaluation of right parotid gland swelling/sialadenitis.    Patient now presents for the above procedure(s).    Echo Summary  No results found for this or any previous visit.       Prev airway: None documented.    LDA: None documented.       Drips: None documented.      Patient Active Problem List   Diagnosis    Gout    Severe obesity (BMI 35.0-39.9) with comorbidity    Encounter for screening colonoscopy    Sialadenitis       Review of patient's allergies indicates:   Allergen Reactions    Betamethasone Other (See Comments)     Hiccups       Current Inpatient Medications:      Current Facility-Administered Medications on File Prior to Encounter   Medication Dose Route Frequency Provider Last Rate Last Admin    0.9%  NaCl infusion   Intravenous Continuous Minoo Glynn MD        lactated ringers infusion   Intravenous Continuous Minoo Glynn MD 10 mL/hr at 08/30/23 1055 Restarted at 08/30/23 1223     Current Outpatient Medications on File Prior to Encounter   Medication Sig Dispense Refill    atorvastatin (LIPITOR) 20 MG tablet Take 1 tablet (20 mg total) by mouth once daily. 90 tablet 4    ciclopirox 0.77 % Gel Apply topically 2 (two) times daily. To toe webspaces 30 g 0    colchicine (COLCRYS) 0.6 mg tablet Day 1 - Take two tabs PO now, repeat one tab in 1 hour. Day 2 and thereafter- Then one tab daily until 48 hours after flare resolves. 20 tablet 0    indomethacin (INDOCIN) 50 MG capsule Take 1 capsule (50 mg total) by mouth every meal as needed (GOUT FLARE). Continue to take for few days after flare resolves. 30  capsule 0       Past Surgical History:   Procedure Laterality Date    COLONOSCOPY N/A 8/30/2023    Procedure: COLONOSCOPY;  Surgeon: Evy Ceja MD;  Location: [facility];  Service: Endoscopy;  Laterality: N/A;       Social History:  Tobacco Use: Low Risk  (8/15/2024)    Patient History     Smoking Tobacco Use: Never     Smokeless Tobacco Use: Never     Passive Exposure: Not on file      Alcohol Use: Not At Risk (5/31/2024)    AUDIT-C     Frequency of Alcohol Consumption: 2-4 times a month     Average Number of Drinks: 1 or 2     Frequency of Binge Drinking: Never        OBJECTIVE:     Vital Signs Range (Last 24H):         Significant Labs:  Lab Results   Component Value Date    WBC 5.85 05/31/2024    HGB 14.7 05/31/2024    HCT 44.1 05/31/2024     05/31/2024    CHOL 224 (H) 05/31/2024    TRIG 115 05/31/2024    HDL 44 05/31/2024    ALT 15 05/31/2024    AST 15 05/31/2024     05/31/2024    K 4.7 05/31/2024     05/31/2024    CREATININE 1.1 05/31/2024    BUN 11 05/31/2024    CO2 27 05/31/2024    PSA 0.76 05/31/2024    HGBA1C 5.7 (H) 05/31/2024       Diagnostic Studies: No relevant studies.    EKG:   No results found for this or any previous visit.    2D ECHO:  TTE:  No results found for this or any previous visit.    MARGARITA:  No results found for this or any previous visit.    ASSESSMENT/PLAN:       Pre-op Assessment    I have reviewed the Patient Summary Reports.     I have reviewed the Nursing Notes.    I have reviewed the Medications.     Review of Systems  Anesthesia Hx:  No problems with previous Anesthesia   History of prior surgery of interest to airway management or planning:  Previous anesthesia: MAC          Denies Personal Hx of Anesthesia complications.                    Social:  Non-Smoker, Social Alcohol Use       Hematology/Oncology:  Hematology Normal   Oncology Normal                                   EENT/Dental:  EENT/Dental Normal           Cardiovascular:        Denies  MI.  Denies CAD.        Denies CHF.                                 Pulmonary:  Pulmonary Normal   Denies COPD.  Denies Asthma.                    Renal/:   Denies Chronic Renal Disease.                Musculoskeletal:     gout            Neurological:  Neurology Normal                                      Endocrine:  Denies Diabetes.         Obesity / BMI > 30      Physical Exam  General: Well nourished, Cooperative, Alert and Oriented    Airway:  Mallampati: II   Mouth Opening: Normal  TM Distance: Normal  Tongue: Normal  Neck ROM: Normal ROM    Dental:  Intact      Anesthesia Plan  Type of Anesthesia, risks & benefits discussed:    Anesthesia Type: Gen ETT  Intra-op Monitoring Plan: Standard ASA Monitors  Post Op Pain Control Plan: multimodal analgesia and IV/PO Opioids PRN  Induction:  IV  Airway Plan: Video, Post-Induction  Informed Consent: Informed consent signed with the Patient and all parties understand the risks and agree with anesthesia plan.  All questions answered.   ASA Score: 2  Day of Surgery Review of History & Physical: H&P Update referred to the surgeon/provider.    Ready For Surgery From Anesthesia Perspective.     .

## 2024-08-28 NOTE — TRANSFER OF CARE
"Anesthesia Transfer of Care Note    Patient: John Barthelemy    Procedure(s) Performed: Procedure(s) (LRB):  SIALENDOSCOPY (Right)    Patient location: [facility]    Anesthesia Type: general    Transport from OR: Transported from OR on 6-10 L/min O2 by face mask with adequate spontaneous ventilation    Post pain: adequate analgesia    Post assessment: no apparent anesthetic complications and tolerated procedure well    Post vital signs: stable    Level of consciousness: awake    Nausea/Vomiting: no nausea/vomiting    Complications: none    Transfer of care protocol was followed      Last vitals: Visit Vitals  /69   Pulse (!) 54   Temp 36.5 °C (97.7 °F)   Resp 20   Ht 5' 9" (1.753 m)   Wt 117 kg (258 lb)   SpO2 97%   BMI 38.10 kg/m²     "

## 2024-08-28 NOTE — DISCHARGE INSTRUCTIONS
Discharge instructions    Ok to resume regular diet as tolerated following surgery.   You may initially prefer to eat soft foods/liquids and avoid spicy/acidic foods immediately following surgery/anesthesia to avoid nausea/vomiting and because you may experience a sore throat from the breathing tube placed during surgery  No driving for 24hr post surgery.   Use OTC pain medications as needed to control pain.   Alternate the use of acetaminophen (Tylenol®) and ibuprofen (Advil®, Motrin®) every 4-6 hours to control your pain (unless otherwise instructed not to take these pain medications by another provider). A low-grade fever (101 degrees or less) following surgery may occur and should be treated with acetaminophen. Follow the directions on the bottle. If the fever persists (more than 2 days) or is greater than 102 degrees, call our office.   You will have a follow up appointment arranged with Dr.Christian NELL Johnson MD/in ENT clinic  in the near future  If you do not know your follow-up appointment date or do not receive a call/notification about your follow-up appointment within 5 days following discharge, please call the clinic during business hours at the following number: [phone]    IF URGENT POSTOPERATIVE CONCERNS/QUESTIONS CALL THE  [phone] AND ASK FOR ENT ON CALL.

## 2024-08-28 NOTE — ANESTHESIA PROCEDURE NOTES
Intubation    Date/Time: 8/28/2024 5:50 PM    Performed by: Chas Garcia CRNA  Authorized by: Raquel Sandoval MD    Intubation:     Induction:  Inhalational - mask    Intubated:  Postinduction    Mask Ventilation:  Easy with oral airway    Attempts:  1    Attempted By:  Other (see comments) (SRNA student Samantha)    Method of Intubation:  Video laryngoscopy    Blade:  Andrade 3    Laryngeal View Grade: Grade I - full view of cords      Difficult Airway Encountered?: No      Complications:  None    Airway Device:  Oral endotracheal tube    Airway Device Size:  7.5    Style/Cuff Inflation:  Cuffed (inflated to minimal occlusive pressure)    Tube secured:  21    Secured at:  The lips    Placement Verified By:  Capnometry    Complicating Factors:  None    Findings Post-Intubation:  BS equal bilateral and atraumatic/condition of teeth unchanged

## 2024-08-28 NOTE — BRIEF OP NOTE
[facility] - [facility] (2nd Fl)  Brief Operative Note/Discharge Note    Surgery Date: 8/28/2024     Surgeons and Role:     * Evert Johnson MD - Primary     * Herbert Betancourt MD - Resident - Assisting    Procedure(s) (LRB):  Procedure(s):  SIALENDOSCOPY    Pre-op Diagnosis:  Sialadenitis [K11.20]    Post-op Diagnosis:  Post-Op Diagnosis Codes:     * Sialadenitis [K11.20]    Procedure(s) (LRB):  SIALENDOSCOPY (Right)    Anesthesia: General    Operative Findings: See full op note for full details- right SMG sialendoscopy    Estimated Blood Loss: Minimal <1cc           Specimens:   Specimens (From admission, onward)      None            Implants:  * No implants in log *    Discharge Note    OUTCOME: Patient tolerated treatment/procedure well without complication and is now ready for discharge.    DISPOSITION: Home or Self Care, discharged in good condition    PREOPERATIVE DIAGNOSIS: Pre-Op Diagnosis Codes:     * Sialadenitis [K11.20]     FINAL DIAGNOSIS:  same as preop, see above  Post-Op Diagnosis Codes:     * Sialadenitis [K11.20]    FOLLOWUP: In clinic    DISCHARGE INSTRUCTIONS:  See discharge tab for complete details. Discussed with patient/family preop.    Discharge Procedure Orders   Diet Adult Regular   Order Comments: As tolerated; start with clear liquids and progress as tolerated     Lifting restrictions   Order Comments: Nothing >10lbs for 2 weeks following surgery     Other restrictions (specify):   Order Comments: Do not lift >10lbs for 2 weeks following surgery    Wound care - if applicable/incision present  - OK to shower (if going home with drain see below), but do not abrasively rub your incision site  - Avoid swimming or soaking (including submerging in bathtub) your incision  - If skin glue is present over your incision, it will begin to peel off on it's own in the next week or so    If you are going home with a drain, please note the following:  - Sponge bathe until your drain is removed. Ok to  bathe below level of drain (below neck), rinse hair/head in sink.     No driving until:   Order Comments: No driving while on narcotic pain medication if prescribed or at least 24h following anesthesia (if applicable)     No dressing needed     Notify your health care provider if you experience any of the following:  temperature >100.4     Notify your health care provider if you experience any of the following:  redness, tenderness, or signs of infection (pain, swelling, redness, odor or green/yellow discharge around incision site)     Notify your health care provider if you experience any of the following:  difficulty breathing or increased cough   Order Comments: Swelling around neck causing difficulty breathing       TIME SPENT ON DISCHARGE: 35 minutes

## 2024-08-29 NOTE — ANESTHESIA POSTPROCEDURE EVALUATION
Anesthesia Post Evaluation    Patient: John Barthelemy    Procedure(s) Performed: Procedure(s) (LRB):  SIALENDOSCOPY (Right)    Final Anesthesia Type: general      Patient location during evaluation: [facility]  Patient participation: Yes- Able to Participate  Level of consciousness: awake and alert  Post-procedure vital signs: reviewed and stable  Pain management: adequate  Airway patency: patent  DANYEL mitigation strategies: Extubation while patient is awake and Multimodal analgesia  PONV status at discharge: No PONV  Anesthetic complications: no      Cardiovascular status: stable  Respiratory status: unassisted and spontaneous ventilation  Hydration status: euvolemic  Follow-up not needed.              Vitals Value Taken Time   /67 08/28/24 2001   Temp 36.7 °C (98 °F) 08/28/24 2000   Pulse 54 08/28/24 2015   Resp 18 08/28/24 2015   SpO2 96 % 08/28/24 2015         No case tracking events are documented in the log.      Pain/Chapis Score: Pain Rating Prior to Med Admin: 6 (8/28/2024  7:33 PM)  Chapis Score: 9 (8/28/2024  7:30 PM)

## 2024-08-29 NOTE — OP NOTE
DATE OF PROCEDURE: 8/28/2024     PREOPERATIVE DIAGNOSES:   Right submandibular sialoadenitis    POSTOPERATIVE DIAGNOSES:   Same    SURGEON:  Surgeons and Role:     * Evert Johnson MD - Primary     * Herbert Betancourt MD - Resident - Assisting      PROCEDURES PERFORMED:   1. Use of operating microscope  2. Diagnostic silo endoscopy of right submandibular ductal system     ANESTHESIA: General      INDICATIONS FOR PROCEDURE:   John Barthelemy is a 49 y.o. man who recently evaluated for intermittent pain and swelling of the right submandibular gland consistent with obstructive sialoadenitis.  CT scan of the neck revealed no evidence of stones.  Given the above, I recommended that we proceed to the operating aforementioned procedures.    He was apprised of the risks, benefits and alternatives to surgery.  In spite of the risk inherent to surgery,he provided informed consent for the aforementioned procedures.     PROCEDURE IN DETAIL:  The patient was taken to the operating room and placed on the operating table in the supine position.  General endotracheal anesthesia was induced by the anesthesia team.     The operating table was rotated 90° to the right.  The patient was draped in the standard sterile fashion.  The operating microscope was brought into the field.  The right submandibular duct was identified and the punctum was dilated wide enough to accommodate a 0.8 mm salivary endoscope.  There was significant stenosis of the duct making dilation quite difficult.    The endoscope was then advanced through the duct to the hilum of the gland.  There were no stones worsening findings of the primary secondary ductal system.  The endoscope was then removed and discarded.  He was handed over to Anesthesia.  He was awakened, extubated and transferred to recovery in satisfactory condition.    There were no intraoperative complications.  I was present for and participated in the entire procedure as dictated above.        ESTIMATED BLOOD LOSS:  Minimal    SPECIMENS:   Specimen (24h ago, onward)      None

## 2024-10-06 ENCOUNTER — E-VISIT (OUTPATIENT)
Dept: INTERNAL MEDICINE | Facility: CLINIC | Age: 49
End: 2024-10-06
Payer: COMMERCIAL

## 2024-10-06 ENCOUNTER — ON-DEMAND VIRTUAL (OUTPATIENT)
Dept: URGENT CARE | Facility: CLINIC | Age: 49
End: 2024-10-06
Payer: COMMERCIAL

## 2024-10-06 ENCOUNTER — PATIENT MESSAGE (OUTPATIENT)
Dept: INTERNAL MEDICINE | Facility: CLINIC | Age: 49
End: 2024-10-06

## 2024-10-06 DIAGNOSIS — M10.9 ACUTE GOUT INVOLVING TOE OF RIGHT FOOT, UNSPECIFIED CAUSE: Primary | ICD-10-CM

## 2024-10-06 DIAGNOSIS — M10.9 ACUTE GOUT INVOLVING TOE OF RIGHT FOOT, UNSPECIFIED CAUSE: ICD-10-CM

## 2024-10-06 PROCEDURE — 99213 OFFICE O/P EST LOW 20 MIN: CPT | Mod: 95,,, | Performed by: NURSE PRACTITIONER

## 2024-10-06 RX ORDER — INDOMETHACIN 50 MG/1
50 CAPSULE ORAL
Qty: 30 CAPSULE | Refills: 0 | Status: SHIPPED | OUTPATIENT
Start: 2024-10-06

## 2024-10-06 NOTE — PROGRESS NOTES
Subjective:      Patient ID: John Barthelemy is a 49 y.o. male.    Vitals:  vitals were not taken for this visit.     Chief Complaint: Joint Swelling      Visit Type: TELE AUDIOVISUAL - This visit was conducted virtually based on  subjective information and limited objective exam    Present with the patient at the time of consultation: TELEMED PRESENT WITH PATIENT: None  Two patient identifiers used to verify patient- saying out date of birth and full name.       Past Medical History:   Diagnosis Date    Chronic left shoulder pain 7/17/2017    Gout      Past Surgical History:   Procedure Laterality Date    COLONOSCOPY N/A 8/30/2023    Procedure: COLONOSCOPY;  Surgeon: Evy Ceja MD;  Location: [facility];  Service: Endoscopy;  Laterality: N/A;    SIALENDOSCOPY Right 8/28/2024    Procedure: SIALENDOSCOPY;  Surgeon: Evert Johnson MD;  Location: [facility] OR [address] [place] [address];  Service: ENT;  Laterality: Right;  Right submandibular  Saliendoscopy set ordered 8/15- IR.     Review of patient's allergies indicates:   Allergen Reactions    Betamethasone Other (See Comments)     Hiccups     Current Outpatient Medications on File Prior to Visit   Medication Sig Dispense Refill    atorvastatin (LIPITOR) 20 MG tablet Take 1 tablet (20 mg total) by mouth once daily. 90 tablet 4    ciclopirox 0.77 % Gel Apply topically 2 (two) times daily. To toe webspaces 30 g 0    colchicine (COLCRYS) 0.6 mg tablet Day 1 - Take two tabs PO now, repeat one tab in 1 hour. Day 2 and thereafter- Then one tab daily until 48 hours after flare resolves. 20 tablet 0    HYDROcodone-acetaminophen (NORCO) 5-325 mg per tablet Take 1 tablet by mouth every 6 (six) hours as needed for Pain (refractory to over the counter medication). Note this medication contains tylenol (acetaminophen) do not exceed >3000mg total tylenol in 24 hour period. 5 tablet 0    [DISCONTINUED] indomethacin (INDOCIN) 50 MG capsule Take 1 capsule (50 mg total) by mouth every meal as  needed (GOUT FLARE). Continue to take for few days after flare resolves. 30 capsule 0     Current Facility-Administered Medications on File Prior to Visit   Medication Dose Route Frequency Provider Last Rate Last Admin    0.9%  NaCl infusion   Intravenous Continuous Minoo Glynn MD        lactated ringers infusion   Intravenous Continuous Minoo Glynn MD 10 mL/hr at 08/30/23 1055 Restarted at 08/30/23 1223     Family History   Problem Relation Name Age of Onset    No Known Problems Mother      Heart disease Father  39        Bypass    No Known Problems Sister      Cancer Paternal Grandmother         Medications Ordered                [id number] - [place], [place] - [address] [address] AT [facility] OF  [address] [facility]   [address] [address] [address]    Telephone: [phone]   Fax: [phone]   Hours: Not open 24 hours                         E-Prescribed (1 of 1)              indomethacin (INDOCIN) 50 MG capsule    Sig: Take 1 capsule (50 mg total) by mouth every meal as needed (GOUT FLARE). Continue to take for few days after flare resolves.       Start: 10/6/24     Quantity: 30 capsule Refills: 0                           Ohs Peq Odvv Intake    10/6/2024  3:04 PM CDT - Filed by Patient   What is your current physical address in the event of a medical emergency? [address]   Are you able to take your vital signs? No   Please attach any relevant images or files    Is your employer contracted with [facility]? No         48 yo male with c/o gout to right foot. He states he is also out of his allopurinol. He states positive pain and swelling. Denies injury or trauma. Hx of gout.         Constitution: Negative.   HENT: Negative.     Cardiovascular: Negative.    Eyes: Negative.    Respiratory: Negative.     Gastrointestinal: Negative.  Negative for bowel incontinence.   Endocrine: negative.   Genitourinary: Negative.  Negative for dysuria, flank pain, bladder  incontinence and pelvic pain.   Musculoskeletal:  Positive for joint pain, joint swelling and gout. Negative for pain, abnormal ROM of joint and back pain.   Skin: Negative.    Allergic/Immunologic: Negative.    Neurological: Negative.    Hematologic/Lymphatic: Negative.    Psychiatric/Behavioral: Negative.          Objective:   The physical exam was conducted virtually.  LOCATION OF PATIENT jana noyolaO x 3 ; no acute distress noted; appearance normal; mood and behavior normal; thought process normal  Head- normocephalic  Nose- appears normal, no discharge or erythema  Eyes- pupils appear normal in size, no drainage, no erythema  Ears- normal appearing; no discharge, no erythema  Mouth- appears normal  Oropharynx- no erythema, lesions  Lungs- breathing at a normal rate, no acute distress noted  Heart- no reports of tachycardia, palpitations, chest pain  Abdomen- non distended, non tender reported by patient  Skin- warm and dry, no erythema or edema noted by patient or visualized  Psych- as above; no si/hi      Assessment:     1. Acute gout involving toe of right foot, unspecified cause        Plan:   Medical record review shows last Rx for allopurinol is from 2022 90 tabs 3 refills. He does not have an active rx. The medication was discontinued in his file. Advised reaching out to his primary care md     Indomethacin sent to pharmacy      Thank you for choosing [facility]!    Our goal in the [facility] is to always provide outstanding medical care. You may receive a survey by mail or e-mail in the next week regarding your experience today. We would greatly appreciate you completing and returning the survey. Your feedback provides us with a way to recognize our staff who provide very good care, and it helps us learn how to improve when your experience was below our aspiration of excellence.         We appreciate you trusting us with your medical care. We hope you feel  better soon. We will be happy to take care of you for all of your future medical needs.    You must understand that you've received an Urgent Care treatment only and that you may be released before all your medical problems are known or treated. You, the patient, will arrange for follow up care as instructed.    Follow up with your PCP or specialty clinic as directed in the next 1-2 weeks if not improved or as needed.  You can call [phone] to schedule an appointment with the appropriate provider.    If your condition worsens we recommend that you receive another evaluation in person, with your primary care provider, urgent care or at the emergency room immediately or contact your primary medical clinics after hours call service to discuss your concerns.         Acute gout involving toe of right foot, unspecified cause  -     indomethacin (INDOCIN) 50 MG capsule; Take 1 capsule (50 mg total) by mouth every meal as needed (GOUT FLARE). Continue to take for few days after flare resolves.  Dispense: 30 capsule; Refill: 0

## 2024-10-07 RX ORDER — COLCHICINE 0.6 MG/1
TABLET ORAL
Qty: 20 TABLET | Refills: 0 | Status: SHIPPED | OUTPATIENT
Start: 2024-10-07 | End: 2024-10-07 | Stop reason: SDUPTHER

## 2024-10-07 RX ORDER — COLCHICINE 0.6 MG/1
0.6 TABLET ORAL 3 TIMES DAILY PRN
Qty: 90 TABLET | Refills: 0 | Status: SHIPPED | OUTPATIENT
Start: 2024-10-07

## 2024-10-07 NOTE — PATIENT INSTRUCTIONS
Patient Education       Low Purine Diet   About this topic   Purines are a natural substance in some foods. They help many systems in our body like our heart and our digestive system. They are also the building blocks for our DNA. Because of some illnesses, you may need to limit the amount of purines in your diet.  General   A low purine diet limits the amount of purines you eat. When your body digests purines, a waste product called uric acid is made. Uric acid crystals can build up in your joints causing a painful kind of arthritis called gout. When you limit foods high in purines, you may be able to prevent painful attacks.  What will the results be?   This diet may help you:  Have fewer problems with gout  Have fewer kidney stones  Lower the amount of uric acid in your body  Who should use this diet?   This diet is used by people who have high uric acid levels. It is also used by people who have a history of uric acid kidney stones.  Who should not use this diet?   You should not use this food plan if you have not talked with your doctor about a low purine diet.  What foods are good to eat?   Water, tea, coffee, cocoa  Popcorn  Low fat or fat free milk, low fat yogurt, low fat cheese  Eggs, nuts, peanut butter  Vegetables (except those that are moderate in purines)  Potatoes, bread, rice, pasta  Fruits (especially cherries) and fruit juices  Condiments like salt, herbs, olives, pickles, relishes, and vinegar  What foods should be limited or avoided?   Foods that are high in purines and those that raise uric acid levels include:  Beer  Gravies and sauces made with meat  Anchovies, sardines, caviar, herring, mussels, tuna, codfish, scallops, trout, and amber  Foods high in fat like urrutia, red meats, fatty poultry, dark meats, skin of poultry, high-fat dairy products  Organ meats like liver, kidney, tripe, and sweetbreads  Wild game like veal, venison, and duck  High fructose corn syrup in foods and  drinks  Yeast  Foods that are moderate in purines include:  Oatmeal. Limit to 2/3 cup (65 g) uncooked each day.  Wheat bran and wheat germ. Limit to 1/4 cup (30 g) dry each day.  Meat, poultry, crab, lobsters, oysters, and shrimp are moderate in purines. Limit to 4 to 6 ounces of these foods per day.  Lunch meats, especially high-fat versions  Asparagus, spinach, cauliflower, and mushrooms. Limit these vegetables to 1/2 cup per day (75 g).  Dried beans, peas, and lentils. Limit these to 1 cup (240 g) cooked each day.  Most kinds of alcohol  Where can I learn more?   American Academy of Family Physicians  http://familydoctor.org/familydoctor/en/prevention-wellness/food-nutrition/weight-loss/low-purine-diet.html   Last Reviewed Date   2021-09-17  Consumer Information Use and Disclaimer   This information is not specific medical advice and does not replace information you receive from your health care provider. This is only a brief summary of general information. It does NOT include all information about conditions, illnesses, injuries, tests, procedures, treatments, therapies, discharge instructions or life-style choices that may apply to you. You must talk with your health care provider for complete information about your health and treatment options. This information should not be used to decide whether or not to accept your health care providers advice, instructions or recommendations. Only your health care provider has the knowledge and training to provide advice that is right for you.  Copyright   Copyright © 2021 [employer]. and its affiliates and/or licensors. All rights reserved.

## 2024-10-07 NOTE — TELEPHONE ENCOUNTER
Care Due:                  Date            Visit Type   Department     Provider  --------------------------------------------------------------------------------    Last Visit: None Found      None         None Found  Next Visit: None Scheduled  None         None Found                                                            Last  Test          Frequency    Reason                     Performed    Due Date  --------------------------------------------------------------------------------    Office Visit  15 months..  atorvastatin.............  Not Found    Overdue    [employer] Embedded Care Due Messages. Reference number: [id number].   10/07/2024 8:38:07 AM THAIST

## 2024-10-12 DIAGNOSIS — M10.9 ACUTE GOUT INVOLVING TOE OF RIGHT FOOT, UNSPECIFIED CAUSE: ICD-10-CM

## 2025-01-16 ENCOUNTER — ON-DEMAND VIRTUAL (OUTPATIENT)
Dept: URGENT CARE | Facility: CLINIC | Age: 50
End: 2025-01-16
Payer: COMMERCIAL

## 2025-01-16 DIAGNOSIS — M10.071 IDIOPATHIC GOUT INVOLVING TOE OF RIGHT FOOT, UNSPECIFIED CHRONICITY: Primary | ICD-10-CM

## 2025-01-16 RX ORDER — INDOMETHACIN 50 MG/1
50 CAPSULE ORAL 3 TIMES DAILY PRN
Qty: 90 CAPSULE | Refills: 0 | Status: SHIPPED | OUTPATIENT
Start: 2025-01-16 | End: 2025-02-15

## 2025-01-16 NOTE — PROGRESS NOTES
Subjective:      Patient ID: John Barthelemy is a 49 y.o. male.    Vitals:  vitals were not taken for this visit.     Chief Complaint: Gout      Visit Type: TELE AUDIOVISUAL    Present with the patient at the time of consultation: TELEMED PRESENT WITH PATIENT: Meseret Weiss     Past Medical History:   Diagnosis Date    Chronic left shoulder pain 7/17/2017    Gout      Past Surgical History:   Procedure Laterality Date    COLONOSCOPY N/A 8/30/2023    Procedure: COLONOSCOPY;  Surgeon: Evy Ceja MD;  Location: [facility];  Service: Endoscopy;  Laterality: N/A;    SIALENDOSCOPY Right 8/28/2024    Procedure: SIALENDOSCOPY;  Surgeon: Evert Johnson MD;  Location: [facility] OR [address] [place] [address];  Service: ENT;  Laterality: Right;  Right submandibular  Saliendoscopy set ordered 8/15- IR.     Review of patient's allergies indicates:   Allergen Reactions    Betamethasone Other (See Comments)     Hiccups     Current Outpatient Medications on File Prior to Visit   Medication Sig Dispense Refill    atorvastatin (LIPITOR) 20 MG tablet Take 1 tablet (20 mg total) by mouth once daily. 90 tablet 4    colchicine (COLCRYS) 0.6 mg tablet Take 1 tablet (0.6 mg total) by mouth 3 (three) times daily as needed (For gout.  Decrease dose as symptoms improve). Day 1 - Take two tabs PO now, repeat one tab in 1 hour. Day 2 and thereafter- Then one tab daily until 48 hours after flare resolves. 90 tablet 0    indomethacin (INDOCIN) 50 MG capsule Take 1 capsule (50 mg total) by mouth every meal as needed (GOUT FLARE). Continue to take for few days after flare resolves. 30 capsule 0     Current Facility-Administered Medications on File Prior to Visit   Medication Dose Route Frequency Provider Last Rate Last Admin    0.9%  NaCl infusion   Intravenous Continuous Minoo Glynn MD        lactated ringers infusion   Intravenous Continuous Minoo Glynn MD 10 mL/hr at 08/30/23 1055 Restarted at 08/30/23 1223     Family History   Problem  Relation Name Age of Onset    No Known Problems Mother      Heart disease Father  39        Bypass    No Known Problems Sister      Cancer Paternal Grandmother         Medications Ordered                [id number] - [place] [facility] [address] [facility] [address]  [address] [place] [address] [address] [address] [address] [facility] [address]    Telephone: [phone]   Fax: [phone]   Hours: Not open 24 hours                         Unspecified Transmission Method (1 of 1)              indomethacin (INDOCIN) 50 MG capsule    Sig: Take 1 capsule (50 mg total) by mouth 3 (three) times daily as needed (take with food).       Start: 1/16/25     Quantity: 90 capsule Refills: 0                           Ohs Peq Odvv Intake    1/16/2025  1:25 PM CST - Filed by Patient   What is your current physical address in the event of a medical emergency? [id number]   Are you able to take your vital signs? No   Please attach any relevant images or files    Is your employer contracted with [facility]? No         Pt presents with c/o gout to the right ankle x 1-2 days. Noted hx of gout. States indomethacin works best for him. Denies any other problems or complaints at this time.         Cardiovascular:  Negative for chest pain.   Respiratory:  Negative for shortness of breath.    Musculoskeletal:  Positive for joint pain.        Ankle with redness and swelling    Skin:  Positive for erythema.   Neurological:  Negative for headaches.        Objective:   The physical exam was conducted virtually.  Physical Exam   Constitutional: He is oriented to person, place, and time.   HENT:   Head: Normocephalic.   Ears:   Right Ear: External ear normal.   Left Ear: External ear normal.   Eyes: Conjunctivae are normal.   Neck: Neck supple.   Pulmonary/Chest: Effort normal. No respiratory distress.   Neurological: He is alert and oriented to person, place, and time.   Skin: erythema       Assessment:     1. Idiopathic gout  involving toe of right foot, unspecified chronicity        Plan:   Pt encouraged to monitor symptoms closely and instructed to follow-up for new or worsening symptoms.     Further, in-person, evaluation may be necessary for continued treatment. Please follow up with your primary care doctor or specialist as needed. Verbally discussed plan.     Idiopathic gout involving toe of right foot, unspecified chronicity  -     indomethacin (INDOCIN) 50 MG capsule; Take 1 capsule (50 mg total) by mouth 3 (three) times daily as needed (take with food).  Dispense: 90 capsule; Refill: 0      We appreciate you trusting us with your medical care. We hope you feel better soon. We will be happy to take care of you for all of your future medical needs.     You must understand that you've received Virtual treatment only and that you may be released before all your medical problems are known or treated. You, the patient, will arrange for follow up care as instructed.     Follow up with your PCP or specialty clinic as directed in the next 1-2 weeks if not improved or as needed. You can call [phone] to schedule an appointment with the appropriate provider.     If your condition worsens we recommend that you receive another evaluation in person, with your primary care provider, urgent care or at the emergency room immediately or contact your primary medical clinics after hours call service to discuss your concerns.

## 2025-01-31 ENCOUNTER — TELEPHONE (OUTPATIENT)
Dept: PHARMACY | Facility: CLINIC | Age: 50
End: 2025-01-31
Payer: COMMERCIAL

## 2025-02-01 NOTE — TELEPHONE ENCOUNTER
[facility]/Population Health Chart Review & Patient Outreach Details For Medication Adherence Project    Reason for Outreach Encounter: 3rd Party payor non-compliance report ([employer], [employer], C, etc)  2.  Patient Outreach Method: TiqIQhart message  3.   Medication in question: atorvastatin   LAST FILLED: 7/19/24 for 90 day supply  Hyperlipidemia Medications               atorvastatin (LIPITOR) 20 MG tablet Take 1 tablet (20 mg total) by mouth once daily.               4.  Reviewed and or Updates Made To: Patient Chart  5. Outreach Outcomes and/or actions taken: Sent inquiry to patient: Waiting for response.

## 2025-02-22 ENCOUNTER — PATIENT MESSAGE (OUTPATIENT)
Dept: INTERNAL MEDICINE | Facility: CLINIC | Age: 50
End: 2025-02-22
Payer: COMMERCIAL

## 2025-02-22 DIAGNOSIS — M10.9 ACUTE GOUT INVOLVING TOE OF RIGHT FOOT, UNSPECIFIED CAUSE: ICD-10-CM

## 2025-02-23 NOTE — TELEPHONE ENCOUNTER
Care Due:                  Date            Visit Type   Department     Provider  --------------------------------------------------------------------------------    Last Visit: None Found      None         None Found  Next Visit: None Scheduled  None         None Found                                                            Last  Test          Frequency    Reason                     Performed    Due Date  --------------------------------------------------------------------------------    Office Visit  15 months..  atorvastatin, colchicine.  Not Found    Overdue    Uric Acid...  12 months..  colchicine...............  08- 08-    [employer] Embedded Care Due Messages. Reference number: [id number].   2/22/2025 10:40:31 PM CST

## 2025-02-24 RX ORDER — COLCHICINE 0.6 MG/1
0.6 TABLET ORAL 3 TIMES DAILY PRN
Qty: 90 TABLET | Refills: 0 | Status: SHIPPED | OUTPATIENT
Start: 2025-02-24

## 2025-02-26 RX ORDER — ALLOPURINOL 300 MG/1
300 TABLET ORAL DAILY
Qty: 90 TABLET | Refills: 3 | Status: SHIPPED | OUTPATIENT
Start: 2025-02-26

## 2025-02-26 NOTE — TELEPHONE ENCOUNTER
Pt requesting Allopurinol to be sent in instead of colyrcs  to [facility] 5/31/24  Fu none    pain, arm

## 2025-03-12 ENCOUNTER — OFFICE VISIT (OUTPATIENT)
Dept: FAMILY MEDICINE | Facility: CLINIC | Age: 50
End: 2025-03-12
Payer: COMMERCIAL

## 2025-03-12 ENCOUNTER — PATIENT MESSAGE (OUTPATIENT)
Dept: FAMILY MEDICINE | Facility: CLINIC | Age: 50
End: 2025-03-12

## 2025-03-12 VITALS
DIASTOLIC BLOOD PRESSURE: 80 MMHG | OXYGEN SATURATION: 98 % | TEMPERATURE: 98 F | HEIGHT: 69 IN | WEIGHT: 255.31 LBS | HEART RATE: 52 BPM | SYSTOLIC BLOOD PRESSURE: 126 MMHG | BODY MASS INDEX: 37.82 KG/M2

## 2025-03-12 DIAGNOSIS — E66.01 SEVERE OBESITY (BMI 35.0-39.9) WITH COMORBIDITY: ICD-10-CM

## 2025-03-12 DIAGNOSIS — Z00.00 ANNUAL PHYSICAL EXAM: Primary | ICD-10-CM

## 2025-03-12 DIAGNOSIS — B35.1 ONYCHOMYCOSIS: ICD-10-CM

## 2025-03-12 PROCEDURE — 99999 PR PBB SHADOW E&M-EST. PATIENT-LVL III: CPT | Mod: PBBFAC,,, | Performed by: FAMILY MEDICINE

## 2025-03-12 RX ORDER — TERBINAFINE HYDROCHLORIDE 250 MG/1
250 TABLET ORAL DAILY
Qty: 30 TABLET | Refills: 2 | Status: SHIPPED | OUTPATIENT
Start: 2025-03-12 | End: 2025-06-10

## 2025-03-12 RX ORDER — SEMAGLUTIDE 0.25 MG/.5ML
0.25 INJECTION, SOLUTION SUBCUTANEOUS
Qty: 2 ML | Refills: 0 | Status: SHIPPED | OUTPATIENT
Start: 2025-03-12 | End: 2025-03-14 | Stop reason: CLARIF

## 2025-03-12 NOTE — PROGRESS NOTES
"Chief Complaint   Patient presents with    [facility] Care       SUBJECTIVE:   John Barthelemy is a 49 y.o. male presenting for his annual checkup.     ECA  Current Medications[1]  Allergies: Betamethasone   Patient Active Problem List    Diagnosis Date Noted    Sialadenitis 08/15/2024    Encounter for screening colonoscopy 08/30/2023    Severe obesity (BMI 35.0-39.9) with comorbidity 06/26/2023    Gout        ROS:  Feeling well. No dyspnea or chest pain on exertion. No abdominal pain, change in bowel habits, black or bloody stools. No urinary tract or prostatic symptoms. No neurological complaints.  Needs to lose weight  Gout is stable    OBJECTIVE:   The patient appears well, alert, oriented x 3, in no distress.   /80   Pulse (!) 52   Temp 97.8 °F (36.6 °C) (Oral)   Ht 5' 9" (1.753 m)   Wt 115.8 kg (255 lb 4.7 oz)   SpO2 98%   BMI 37.70 kg/m²   Wt Readings from Last 5 Encounters:   03/12/25 115.8 kg (255 lb 4.7 oz)   08/28/24 117 kg (258 lb)   06/11/24 115.3 kg (254 lb 3.1 oz)   05/31/24 115.3 kg (254 lb 3.1 oz)   08/31/23 112.5 kg (248 lb 0.3 oz)       ENT normal.  Neck supple. No adenopathy or thyromegaly. CANDICE. Lungs are clear, good air entry, no wheezes, rhonchi or rales. S1 and S2 normal, no murmurs, regular rate and rhythm. Abdomen is soft without tenderness, guarding, mass or organomegaly.  exam: deferred.  Extremities show no edema, normal peripheral pulses. Neurological is normal without focal findings. Fungal infection on toes.    ASSESSMENT:   1. Annual physical exam    2. Onychomycosis    3. Severe obesity (BMI 35.0-39.9) with comorbidity          PLAN:   Counseled on age appropriate medical preventative services, including age appropriate cancer screenings, over all nutritional health, need for a consistent exercise regimen and an over all push towards maintaining a vigorous and active lifestyle.  Counseled on age appropriate vaccines and discussed upcoming health care needs based on " age/gender.  Spent time with patient counseling on need for a good patient/doctor relationship moving forward.  Discussed use of common OTC medications and supplements.  Discussed common dietary aids and use of caffeine and the need for good sleep hygiene and stress management.    Problem List Items Addressed This Visit       Severe obesity (BMI 35.0-39.9) with comorbidity    Relevant Medications    semaglutide, weight loss, (WEGOVY) 0.25 mg/0.5 mL PnIj     Other Visit Diagnoses         Annual physical exam    -  Primary    Relevant Orders    CBC Auto Differential    Comprehensive Metabolic Panel    Urinalysis, Reflex to Urine Culture Urine, Clean Catch    PSA, Screening    Hemoglobin A1C    Lipid Panel    TSH    Testosterone    Microalbumin/Creatinine Ratio, Urine    Uric Acid      Onychomycosis        Relevant Medications    terbinafine HCL (LAMISIL) 250 mg tablet            If the wegovy isn't covered use [facility]       [1]   Current Outpatient Medications   Medication Sig Dispense Refill    allopurinoL (ZYLOPRIM) 300 MG tablet Take 1 tablet (300 mg total) by mouth once daily. 90 tablet 3    colchicine (COLCRYS) 0.6 mg tablet Take 1 tablet (0.6 mg total) by mouth 3 (three) times daily as needed (For gout.  Decrease dose as symptoms improve). Day 1 - Take two tabs PO now, repeat one tab in 1 hour. Day 2 and thereafter- Then one tab daily until 48 hours after flare resolves. 90 tablet 0    indomethacin (INDOCIN) 50 MG capsule Take 1 capsule (50 mg total) by mouth every meal as needed (GOUT FLARE). Continue to take for few days after flare resolves. 30 capsule 0    atorvastatin (LIPITOR) 20 MG tablet Take 1 tablet (20 mg total) by mouth once daily. (Patient not taking: Reported on 3/12/2025) 90 tablet 4    semaglutide, weight loss, (WEGOVY) 0.25 mg/0.5 mL PnIj Inject 0.25 mg into the skin every 7 days. 2 mL 0    terbinafine HCL (LAMISIL) 250 mg tablet Take 1 tablet (250 mg total) by mouth once  daily. 30 tablet 2     No current facility-administered medications for this visit.     Facility-Administered Medications Ordered in Other Visits   Medication Dose Route Frequency Provider Last Rate Last Admin    0.9%  NaCl infusion   Intravenous Continuous Minoo Glynn MD        lactated ringers infusion   Intravenous Continuous Minoo Glynn MD 10 mL/hr at 08/30/23 1055 Restarted at 08/30/23 1223

## 2025-03-14 ENCOUNTER — RESULTS FOLLOW-UP (OUTPATIENT)
Dept: FAMILY MEDICINE | Facility: CLINIC | Age: 50
End: 2025-03-14

## 2025-03-22 ENCOUNTER — ON-DEMAND VIRTUAL (OUTPATIENT)
Dept: URGENT CARE | Facility: CLINIC | Age: 50
End: 2025-03-22
Payer: COMMERCIAL

## 2025-03-22 DIAGNOSIS — M10.9 ACUTE GOUT INVOLVING TOE OF RIGHT FOOT, UNSPECIFIED CAUSE: ICD-10-CM

## 2025-03-22 DIAGNOSIS — M10.9 ACUTE GOUT OF LEFT KNEE, UNSPECIFIED CAUSE: Primary | ICD-10-CM

## 2025-03-22 PROCEDURE — 98005 SYNCH AUDIO-VIDEO EST LOW 20: CPT | Mod: 95,,, | Performed by: NURSE PRACTITIONER

## 2025-03-22 RX ORDER — METHYLPREDNISOLONE 4 MG/1
TABLET ORAL
Qty: 21 EACH | Refills: 0 | Status: SHIPPED | OUTPATIENT
Start: 2025-03-22

## 2025-03-22 RX ORDER — COLCHICINE 0.6 MG/1
0.6 TABLET ORAL 3 TIMES DAILY PRN
Qty: 90 TABLET | Refills: 0 | Status: SHIPPED | OUTPATIENT
Start: 2025-03-22

## 2025-03-23 NOTE — PATIENT INSTRUCTIONS
Patient Education       Gout Discharge Instructions   About this topic   Gout causes pain and swelling in a joint, which is a form of arthritis. Gout can happen in some people who have too much uric acid in their blood. Everyone has some uric acid in their blood. It is produced when the body breaks down certain foods. In some cases, too much uric acid builds up.  Uric acid can form sharp crystals that can sometimes build up in different parts of your body, like your joints. Then you may have pain and swelling. This is called a gout flare or attack. Most of the time a painful joint from gout will get better, especially with treatment, within a few days to a few weeks.     Proper treatment may improve signs and help prevent more gout attacks. Treatments needed are drugs, diet, and lifestyle changes. Surgery may be needed if the gouty part (tophi) is affecting your movement.  What care is needed at home?   Ask your doctor what you need to do when you go home. Make sure you ask questions if you do not understand what the doctor says.  Take all your medicines as ordered for treating your gout flare. This may include medicines ordered by your doctor or medicines like ibuprofen or naproxen for swelling and pain. These are nonsteroidal anti-inflammatory drugs (NSAIDS). Some over-the-counter medicines and prescription medicines contain the same drug. Do not take multiple medicines without talking to your doctor first.  Do not take aspirin to treat your gout flare.  Rest your joint. Prop it on pillows, keeping it above the level of your heart. This may help lessen pain and swelling.  Ice may help with your pain. Place an ice pack or a bag of frozen vegetables wrapped in a towel over the painful part. Never put ice right on the skin. Do not leave the ice on more than 10 to 15 minutes at a time.  Eat a healthy diet that includes plenty of fruits, vegetables, whole grain, and low-fat dairy products.  Drink plenty of water. Limit  sugary drinks and alcohol as they can make your gout flare worse.  Talk with your regular doctor about other medicines or lifestyle changes that can help prevent gout flares. Keeping a healthy weight or losing weight in a healthy way may help.  What follow-up care is needed?   Your doctor may ask you to make visits to the office to check on your progress. Be sure to keep these visits.  You may need to have blood tests to measure your uric acid levels. The doctor may also order tests to check how well your kidneys are working.  What drugs may be needed?   The doctor may order drugs to:  Help with pain and swelling  Lower uric acid levels  Prevent gout attacks  Your doctor may also change some of the drugs you are taking. Certain drugs may increase your uric acid levels and this makes gout worse.  Will physical activity be limited?   Exercise regularly. This can help reduce pain and improve your body function. Your doctor can help you figure out the best exercise for you.  What changes to diet are needed?   Ask for a dietitian to review your food choices.  Avoid high-purine foods such as:  Red meats like steak and hamburgers  Organ meat like kidney, liver, or brains  Wild game like rabbit, venison, quail, pheasant, and goose  [place], anchovies, sardines, or caviar  Seafood and shellfish like shrimp, lobsters, mackerel, sardines, mussels, tuna, trout, codfish, amber, herring, or scallops  Beer, wine, and mixed drinks (alcohol)  Dried beans and peas  Some vegetables, such as asparagus, mushrooms, spinach, and cauliflower  Gravy  Avoid high-fructose foods such as:  Sweetened drinks and juices  Foods with added fructose corn syrup like sodas, enriched fruit drinks, cereals, ice creams, and candy  What problems could happen?   Kidney stones and other kidney problems  Long-lasting joint problem  Ongoing very bad pain  What can be done to prevent this health problem?   Tell your doctor if you are taking drugs for heart and  kidney problems. Tell your doctor about all the drugs, vitamins, herbs, supplements, and any over-the-counter drugs you are taking. If you need to stop taking these drugs, ask your doctor first.  When do I need to call the doctor?   You have a fever of 102.2°F (39°C) or higher, or chills with severe joint pain that does not get better with treatment.  You have a fever of 100.4°F (38°C) or higher or chills.  You have pain with passing urine.  Your symptoms are not improving within 2 to 3 days or your pain does not go away completely after a few weeks.  Teach Back: Helping You Understand   The Teach Back Method helps you understand the information we are giving you. After you talk with the staff, tell them in your own words what you learned. This helps to make sure the staff has described each thing clearly. It also helps to explain things that may have been confusing. Before going home, make sure you can do these:  I can tell you about my condition.  I can tell you what may help ease my pain.  I can tell you what changes I need to make with my diet or drugs.  I can tell you what I will do if I have warm, red, swollen joints or very bad pain.  Where can I learn more?   American Academy of Family Physicians  https://familydoctor.org/condition/gout/   National [place] of Arthritis and Musculoskeletal and Skin Diseases  http://www.niams.nih.gov/Health_Info/Gout/default.asp   NHS  https://www.nhs.uk/conditions/gout/   Last Reviewed Date   2021-06-16  Consumer Information Use and Disclaimer   This information is not specific medical advice and does not replace information you receive from your health care provider. This is only a brief summary of general information. It does NOT include all information about conditions, illnesses, injuries, tests, procedures, treatments, therapies, discharge instructions or life-style choices that may apply to you. You must talk with your health care provider for complete information  about your health and treatment options. This information should not be used to decide whether or not to accept your health care providers advice, instructions or recommendations. Only your health care provider has the knowledge and training to provide advice that is right for you.  Copyright   Copyright © 2021 [employer] and its affiliates and/or licensors. All rights reserved.

## 2025-03-23 NOTE — PROGRESS NOTES
Subjective:      Patient ID: John Barthelemy is a 49 y.o. male.    Vitals:  vitals were not taken for this visit.     Chief Complaint: Gout      Visit Type: TELE AUDIOVISUAL    Patient Location: Home     Present with the patient at the time of consultation: TELEMED PRESENT WITH PATIENT: None    Past Medical History:   Diagnosis Date    Chronic left shoulder pain 7/17/2017    Gout      Past Surgical History:   Procedure Laterality Date    COLONOSCOPY N/A 8/30/2023    Procedure: COLONOSCOPY;  Surgeon: Evy Ceja MD;  Location: [facility];  Service: Endoscopy;  Laterality: N/A;    SIALENDOSCOPY Right 8/28/2024    Procedure: SIALENDOSCOPY;  Surgeon: Evert Johnson MD;  Location: [facility] [address] [place] [address];  Service: ENT;  Laterality: Right;  Right submandibular  Saliendoscopy set ordered 8/15- IR.     Review of patient's allergies indicates:   Allergen Reactions    Betamethasone Other (See Comments)     Hiccups     Medications Ordered Prior to Encounter[1]  Family History   Problem Relation Name Age of Onset    No Known Problems Mother      Heart disease Father  39        Bypass    No Known Problems Sister      Cancer Paternal Grandmother         Medications Ordered                [id number] - [place] [id number] [place] [address] AT [facility] [address] [address]    Telephone: [phone]   Fax: [phone]   Hours: Not open 24 hours                         E-Prescribed (2 of 2)              colchicine (COLCRYS) 0.6 mg tablet    Sig: Take 1 tablet (0.6 mg total) by mouth 3 (three) times daily as needed (For gout.  Decrease dose as symptoms improve). Day 1 - Take two tabs PO now, repeat one tab in 1 hour. Day 2 and thereafter- Then one tab daily until 48 hours after flare resolves.       Start: 3/22/25     Quantity: 90 tablet Refills: 0                         methylPREDNISolone (MEDROL DOSEPACK) 4 mg tablet    Sig: use as directed       Start: 3/22/25      Quantity: 21 each Refills: 0                           Ohs Peq Odvv Intake    3/22/2025  6:48 PM CDT - Filed by Patient   What is your current physical address in the event of a medical emergency? [id number]   Are you able to take your vital signs? No   Please attach any relevant images or files    Is your employer contracted with [facility]? No         Gout flare-up to the left knee. Onset this AM. Recent decreased fluid intake. Believes could be the result of dehydration. +swelling. No redness. Mild warmth. Took indomethacin, aleve and a topical cream with little relief.        Constitution: Negative for fever.   Musculoskeletal:  Positive for pain, joint pain, joint swelling, abnormal ROM of joint, gout and pain with walking. Negative for trauma.   Skin:  Negative for erythema and bruising.        Objective:   The physical exam was conducted virtually.  Physical Exam   Constitutional: He is oriented to person, place, and time. He does not appear ill. No distress.   HENT:   Head: Normocephalic and atraumatic.   Nose: Nose normal.   Eyes: Extraocular movement intact   Pulmonary/Chest: Effort normal.   Abdominal: Normal appearance.   Musculoskeletal:      Left knee: He exhibits decreased range of motion and swelling. He exhibits no erythema. Tenderness found.   Neurological: no focal deficit. He is alert and oriented to person, place, and time.   Skin: No erythema   Psychiatric: His behavior is normal. Mood normal.   Vitals reviewed.      Assessment:     1. Acute gout of left knee, unspecified cause    2. Acute gout involving toe of right foot, unspecified cause        Plan:     Patient encouraged to monitor symptoms closely and instructed to follow-up for new or worsening symptoms. Further, in-person, evaluation may be necessary for continued treatment. Please follow up with your primary care doctor or specialist as needed. Verbally discussed plan. Patient confirms  understanding and is in agreement with treatment and plan.     You must understand that you've received a [facility] evaluation only and that you may be released before all your medical problems are known or treated. You, the patient, will arrange for follow up care as instructed.    Acute gout of left knee, unspecified cause  -     colchicine (COLCRYS) 0.6 mg tablet; Take 1 tablet (0.6 mg total) by mouth 3 (three) times daily as needed (For gout.  Decrease dose as symptoms improve). Day 1 - Take two tabs PO now, repeat one tab in 1 hour. Day 2 and thereafter- Then one tab daily until 48 hours after flare resolves.  Dispense: 90 tablet; Refill: 0  -     methylPREDNISolone (MEDROL DOSEPACK) 4 mg tablet; use as directed  Dispense: 21 each; Refill: 0    Acute gout involving toe of right foot, unspecified cause      Patient Instructions   Patient Education       Gout Discharge Instructions   About this topic   Gout causes pain and swelling in a joint, which is a form of arthritis. Gout can happen in some people who have too much uric acid in their blood. Everyone has some uric acid in their blood. It is produced when the body breaks down certain foods. In some cases, too much uric acid builds up.  Uric acid can form sharp crystals that can sometimes build up in different parts of your body, like your joints. Then you may have pain and swelling. This is called a gout flare or attack. Most of the time a painful joint from gout will get better, especially with treatment, within a few days to a few weeks.     Proper treatment may improve signs and help prevent more gout attacks. Treatments needed are drugs, diet, and lifestyle changes. Surgery may be needed if the gouty part (tophi) is affecting your movement.  What care is needed at home?   Ask your doctor what you need to do when you go home. Make sure you ask questions if you do not understand what the doctor says.  Take all your medicines as ordered for treating your  gout flare. This may include medicines ordered by your doctor or medicines like ibuprofen or naproxen for swelling and pain. These are nonsteroidal anti-inflammatory drugs (NSAIDS). Some over-the-counter medicines and prescription medicines contain the same drug. Do not take multiple medicines without talking to your doctor first.  Do not take aspirin to treat your gout flare.  Rest your joint. Prop it on pillows, keeping it above the level of your heart. This may help lessen pain and swelling.  Ice may help with your pain. Place an ice pack or a bag of frozen vegetables wrapped in a towel over the painful part. Never put ice right on the skin. Do not leave the ice on more than 10 to 15 minutes at a time.  Eat a healthy diet that includes plenty of fruits, vegetables, whole grain, and low-fat dairy products.  Drink plenty of water. Limit sugary drinks and alcohol as they can make your gout flare worse.  Talk with your regular doctor about other medicines or lifestyle changes that can help prevent gout flares. Keeping a healthy weight or losing weight in a healthy way may help.  What follow-up care is needed?   Your doctor may ask you to make visits to the office to check on your progress. Be sure to keep these visits.  You may need to have blood tests to measure your uric acid levels. The doctor may also order tests to check how well your kidneys are working.  What drugs may be needed?   The doctor may order drugs to:  Help with pain and swelling  Lower uric acid levels  Prevent gout attacks  Your doctor may also change some of the drugs you are taking. Certain drugs may increase your uric acid levels and this makes gout worse.  Will physical activity be limited?   Exercise regularly. This can help reduce pain and improve your body function. Your doctor can help you figure out the best exercise for you.  What changes to diet are needed?   Ask for a dietitian to review your food choices.  Avoid high-purine foods such  as:  Red meats like steak and hamburgers  Organ meat like kidney, liver, or brains  Wild game like rabbit, venison, quail, pheasant, and goose  Hernandez, anchovies, sardines, or caviar  Seafood and shellfish like shrimp, lobsters, mackerel, sardines, mussels, tuna, trout, codfish, amber, herring, or scallops  Beer, wine, and mixed drinks (alcohol)  Dried beans and peas  Some vegetables, such as asparagus, mushrooms, spinach, and cauliflower  Gravy  Avoid high-fructose foods such as:  Sweetened drinks and juices  Foods with added fructose corn syrup like sodas, enriched fruit drinks, cereals, ice creams, and candy  What problems could happen?   Kidney stones and other kidney problems  Long-lasting joint problem  Ongoing very bad pain  What can be done to prevent this health problem?   Tell your doctor if you are taking drugs for heart and kidney problems. Tell your doctor about all the drugs, vitamins, herbs, supplements, and any over-the-counter drugs you are taking. If you need to stop taking these drugs, ask your doctor first.  When do I need to call the doctor?   You have a fever of 102.2°F (39°C) or higher, or chills with severe joint pain that does not get better with treatment.  You have a fever of 100.4°F (38°C) or higher or chills.  You have pain with passing urine.  Your symptoms are not improving within 2 to 3 days or your pain does not go away completely after a few weeks.  Teach Back: Helping You Understand   The Teach Back Method helps you understand the information we are giving you. After you talk with the staff, tell them in your own words what you learned. This helps to make sure the staff has described each thing clearly. It also helps to explain things that may have been confusing. Before going home, make sure you can do these:  I can tell you about my condition.  I can tell you what may help ease my pain.  I can tell you what changes I need to make with my diet or drugs.  I can tell you what I  will do if I have warm, red, swollen joints or very bad pain.  Where can I learn more?   American Academy of Family Physicians  https://familydoctor.org/condition/gout/   National [place] of Arthritis and Musculoskeletal and Skin Diseases  http://www.niams.nih.gov/Health_Info/Gout/default.asp   NHS  https://www.nhs.uk/conditions/gout/   Last Reviewed Date   2021-06-16  Consumer Information Use and Disclaimer   This information is not specific medical advice and does not replace information you receive from your health care provider. This is only a brief summary of general information. It does NOT include all information about conditions, illnesses, injuries, tests, procedures, treatments, therapies, discharge instructions or life-style choices that may apply to you. You must talk with your health care provider for complete information about your health and treatment options. This information should not be used to decide whether or not to accept your health care providers advice, instructions or recommendations. Only your health care provider has the knowledge and training to provide advice that is right for you.  Copyright   Copyright © 2021 UpToDate, Inc. and its affiliates and/or licensors. All rights reserved.                     [1]   Current Outpatient Medications on File Prior to Visit   Medication Sig Dispense Refill    allopurinoL (ZYLOPRIM) 300 MG tablet Take 1 tablet (300 mg total) by mouth once daily. 90 tablet 3    atorvastatin (LIPITOR) 20 MG tablet Take 1 tablet (20 mg total) by mouth once daily. (Patient not taking: Reported on 3/12/2025) 90 tablet 4    indomethacin (INDOCIN) 50 MG capsule Take 1 capsule (50 mg total) by mouth every meal as needed (GOUT FLARE). Continue to take for few days after flare resolves. 30 capsule 0    semaglutide, weight loss, 2.4 mg/0.75 mL PnIj COMPOUNDED INJECTION:5mg/mL-3mL (15mg) use as directed maximum 50 units weekly 3 mL 5    terbinafine HCL (LAMISIL) 250 mg  tablet Take 1 tablet (250 mg total) by mouth once daily. 30 tablet 2    [DISCONTINUED] colchicine (COLCRYS) 0.6 mg tablet Take 1 tablet (0.6 mg total) by mouth 3 (three) times daily as needed (For gout.  Decrease dose as symptoms improve). Day 1 - Take two tabs PO now, repeat one tab in 1 hour. Day 2 and thereafter- Then one tab daily until 48 hours after flare resolves. 90 tablet 0     Current Facility-Administered Medications on File Prior to Visit   Medication Dose Route Frequency Provider Last Rate Last Admin    0.9%  NaCl infusion   Intravenous Continuous Minoo Glynn MD        lactated ringers infusion   Intravenous Continuous Minoo Glynn MD 10 mL/hr at 08/30/23 1055 Restarted at 08/30/23 1220

## 2025-03-24 ENCOUNTER — OFFICE VISIT (OUTPATIENT)
Dept: SPORTS MEDICINE | Facility: CLINIC | Age: 50
End: 2025-03-24
Payer: COMMERCIAL

## 2025-03-24 ENCOUNTER — HOSPITAL ENCOUNTER (OUTPATIENT)
Dept: RADIOLOGY | Facility: HOSPITAL | Age: 50
Discharge: HOME | End: 2025-03-24
Attending: ORTHOPAEDIC SURGERY
Payer: COMMERCIAL

## 2025-03-24 VITALS — WEIGHT: 250 LBS | BODY MASS INDEX: 37.03 KG/M2 | HEIGHT: 69 IN

## 2025-03-24 DIAGNOSIS — M10.9 ACUTE GOUT OF LEFT KNEE, UNSPECIFIED CAUSE: ICD-10-CM

## 2025-03-24 DIAGNOSIS — M25.562 LEFT KNEE PAIN, UNSPECIFIED CHRONICITY: ICD-10-CM

## 2025-03-24 DIAGNOSIS — M25.462 EFFUSION OF LEFT KNEE: ICD-10-CM

## 2025-03-24 DIAGNOSIS — M25.562 ACUTE PAIN OF LEFT KNEE: Primary | ICD-10-CM

## 2025-03-24 LAB
APPEARANCE FLD: NORMAL
COLOR FLD: NORMAL
LYMPHOCYTES NFR FLD MANUAL: 4 %
MONOS+MACROS NFR FLD MANUAL: 2 %
NEUTROPHILS NFR FLD MANUAL: 94 %
WBC # FLD: NORMAL /CU MM

## 2025-03-24 PROCEDURE — 73564 X-RAY EXAM KNEE 4 OR MORE: CPT | Mod: 26,LT,, | Performed by: RADIOLOGY

## 2025-03-24 PROCEDURE — 89060 EXAM SYNOVIAL FLUID CRYSTALS: CPT | Performed by: ORTHOPAEDIC SURGERY

## 2025-03-24 PROCEDURE — 87070 CULTURE OTHR SPECIMN AEROBIC: CPT | Performed by: ORTHOPAEDIC SURGERY

## 2025-03-24 PROCEDURE — 73562 X-RAY EXAM OF KNEE 3: CPT | Mod: TC,PN,RT

## 2025-03-24 PROCEDURE — 87075 CULTR BACTERIA EXCEPT BLOOD: CPT | Performed by: ORTHOPAEDIC SURGERY

## 2025-03-24 PROCEDURE — 73562 X-RAY EXAM OF KNEE 3: CPT | Mod: 26,59,RT, | Performed by: RADIOLOGY

## 2025-03-24 PROCEDURE — 84560 ASSAY OF URINE/URIC ACID: CPT | Performed by: ORTHOPAEDIC SURGERY

## 2025-03-24 PROCEDURE — 99999 PR PBB SHADOW E&M-EST. PATIENT-LVL III: CPT | Mod: PBBFAC,,, | Performed by: ORTHOPAEDIC SURGERY

## 2025-03-24 PROCEDURE — 85060 BLOOD SMEAR INTERPRETATION: CPT | Mod: ,,, | Performed by: PATHOLOGY

## 2025-03-24 PROCEDURE — 89051 BODY FLUID CELL COUNT: CPT | Performed by: ORTHOPAEDIC SURGERY

## 2025-03-24 PROCEDURE — 3008F BODY MASS INDEX DOCD: CPT | Mod: CPTII,S$GLB,, | Performed by: ORTHOPAEDIC SURGERY

## 2025-03-24 PROCEDURE — 87205 SMEAR GRAM STAIN: CPT | Performed by: ORTHOPAEDIC SURGERY

## 2025-03-24 PROCEDURE — 3044F HG A1C LEVEL LT 7.0%: CPT | Mod: CPTII,S$GLB,, | Performed by: ORTHOPAEDIC SURGERY

## 2025-03-24 PROCEDURE — 99215 OFFICE O/P EST HI 40 MIN: CPT | Mod: S$GLB,,, | Performed by: ORTHOPAEDIC SURGERY

## 2025-03-24 NOTE — PROCEDURES
Large Joint Aspiration/Injection    Date/Time: 3/24/2025 1:45 PM    Performed by: Maylin Reeves PA-C  Authorized by: Brandan Tucker MD    Consent Done?:  Yes (Verbal)  Indications:  Joint swelling and pain  Site marked: the procedure site was marked    Timeout: prior to procedure the correct patient, procedure, and site was verified    Prep: patient was prepped and draped in usual sterile fashion      Local anesthesia used?: Yes    Anesthesia:  Local infiltration  Local anesthetic:  Lidocaine 1% without epinephrine    Details:  Needle Size:  25 G  Ultrasonic Guidance for needle placement?: No    Approach:  Anterolateral  Location:  Knee  Aspirate amount (mL):  57  Aspirate:  Serous and blood-tinged  Lab: fluid sent for laboratory analysis    Patient tolerance:  Patient tolerated the procedure well with no immediate complications     1% Lidocaine was used for local anesthetic  Aspiration was performed with an 18g needle.

## 2025-03-24 NOTE — PATIENT INSTRUCTIONS
Assessment:  John Barthelemy is a  49 y.o. male   [facility] with a chief complaint of Pain of the Left Knee    Left knee effusion, no injury   Likely Gout flare up due to previous history of gout  Need to rule out septic joint    Encounter Diagnoses   Name Primary?    Acute pain of left knee Yes    Acute gout of left knee, unspecified cause     Effusion of left knee         Plan:  Recommend starting colchicine  already prescribed by urgent care, would recommend follow up with PCP for continued management of gout   Left knee aspiration to rule out infection vs gout  Inflammatory labs panel including Sed rate, CRP, CBC with Diff and Uric Acid ordered today to be completed  Discussed possible CSI if confirmed gout flare up but would like to rule out infection first     Follow-up: 1 week with Maylin Reeves. Please reach out to us sooner if there are any problems between now and then.    About Dr. Garrett Tucker's Research & Publications    Give us Feedback:   Google: Leave Google Review  Healthgrades: Leave Healthgrades Review

## 2025-03-25 ENCOUNTER — TELEPHONE (OUTPATIENT)
Dept: SPORTS MEDICINE | Facility: CLINIC | Age: 50
End: 2025-03-25
Payer: COMMERCIAL

## 2025-03-25 ENCOUNTER — TELEPHONE (OUTPATIENT)
Dept: FAMILY MEDICINE | Facility: CLINIC | Age: 50
End: 2025-03-25
Payer: COMMERCIAL

## 2025-03-25 LAB
CRYSTAL, BODY FLUID (OHS): ABNORMAL
PATHOLOGIST REVIEW - CRYSTALS BF (OHS): NORMAL

## 2025-03-25 NOTE — TELEPHONE ENCOUNTER
----- Message from Tess sent at 3/25/2025  9:20 AM CDT -----  Regarding: Medication Refill  Contact: Patient  Type:  RX Refill RequestWho Called: Patient Refill or New Rx: New RX RX Name and Strength:colchicine (COLCRYS) 0.6 mg tabletHow is the patient currently taking it? (ex. 1XDay):Take 1 tablet (0.6 mg total) by mouth 3 (three) times daily as needed (For gout.  Decrease dose as symptoms improve). Day 1 - Take two tabs PO now, repeat one tab in 1 hour. Day 2 and thereafter- Then one tab daily until 48 hours after flare resolves. - OralIs this a 30 day or 90 day RX:30 day [facility] with phone number[id number] [place] [facility] [address]  AT [facility] OF  [address] [place] [address] Phone: [phone] or Mail Order: Local Ordering Provider: Sharyn Would the patient rather a call back or a response via [facility]? Call back Best Call Back Number: [phone] Information: Patient went to pharmacy for pickup and was advised prescription was never received on 03/22/2025 Patient is requesting prescription be sent to pharmacy again Please Assist  ----- Message -----  From: Tess Gómez  Sent: 3/25/2025   9:27 AM CDT  To: Sharyn Carlson Staff  Subject: Medication Refill                                Type:  RX Refill RequestWho Called: Patient Refill or New Rx: New RX RX Name and Strength:colchicine (COLCRYS) 0.6 mg tabletHow is the patient currently taking it? (ex. 1XDay):Take 1 tablet (0.6 mg total) by mouth 3 (three) times daily as needed (For gout.  Decrease dose as symptoms improve). Day 1 - Take two tabs PO now, repeat one tab in 1 hour. Day 2 and thereafter- Then one tab daily until 48 hours after flare resolves. - OralIs this a 30 day or 90 day RX:30 day Preferred Pharmacy with phone number:[facility] #[id number] - [place] - [address]  AT [facility] [address]  [address] [place] [address] Phone: [phone] or Mail Order: Local Ordering Provider: Sharyn Would the patient rather a  call back or a response via [facility]? Call back Best Call Back Number: [phone] Information: Patient went to pharmacy for pickup and was advised prescription was never received on 03/22/2025 Patient is requesting prescription be sent to pharmacy again Please Assist  ----- Message -----  From: Tess Gómez  Sent: 3/25/2025   9:22 AM CDT  To: Garrett Gipson Staff  Subject: Medication Refill                                Type:  RX Refill RequestWho Called: Patient Refill or New Rx: Refill RX Name and Strength:colchicine (COLCRYS) 0.6 mg tabletHow is the patient currently taking it? (ex. 1XDay):Take 1 tablet (0.6 mg total) by mouth 3 (three) times daily as needed (For gout.  Decrease dose as symptoms improve). Day 1 - Take two tabs PO now, repeat one tab in 1 hour. Day 2 and thereafter- Then one tab daily until 48 hours after flare resolves. - OralIs this a 30 day or 90 day RX:30 day Preferred Pharmacy with phone number:[facility] #[id number] [place], [place] - [address] AT [facility] [place]  [address] [place] [address] Phone: [phone] or Mail Order: Local Ordering Provider: Sharyn Would the patient rather a call back or a response via [facility]? Call back Best Call Back Number: [phone] Information: Medication was originally prescribed by Urgent Care Patient was seen by physician on 03/24/2025 Please Assist

## 2025-03-25 NOTE — TELEPHONE ENCOUNTER
This medication was ordered by another provider. Provider, Robyn Coles, CHRISTOPHER refused the medication.

## 2025-03-25 NOTE — TELEPHONE ENCOUNTER
Called patient. Left voicemail. Explained we cannot prescribed that medication and it wound need to be handled through PCP. Offered follow up sooner though for injection as labs have come back.     ----- Message from Med Assistant Farnsworth sent at 3/25/2025 10:55 AM CDT -----  Wanted to go ahead and send this to make sure it gets taken care of. Not sure if we fill this medication or not and I didn't see anything in the last note from [place] about medication being filled/prescribed.  ----- Message -----  From: Neha Herbert  Sent: 3/25/2025   9:40 AM CDT  To: Garrett Gipson Staff    Type:  RX Refill RequestWho Called: JohnRefill or New Rx:RefillRX Name and Strength:colchicine (COLCRYS) 0.6 mg tabletHow is the patient currently taking it? (ex. 1XDay):Is this a 30 day or 90 day RX:Preferred Pharmacy with phone number:[facility] #[id number] [place] - [address] [facility] [address] AT [facility] [address] [place] [id number] [address]Wwyyi: [phone] Fax: [phone] Local or Mail Order:LocalOrdering Provider:Robyn Ball the patient rather a call back or a response via [facility]? CallbackBest Call Back Number:[phone] Information: [facility] state prescription need to be resent because it was not received/ Please call patient once sent in

## 2025-03-26 ENCOUNTER — TELEPHONE (OUTPATIENT)
Dept: SPORTS MEDICINE | Facility: CLINIC | Age: 50
End: 2025-03-26
Payer: COMMERCIAL

## 2025-03-26 ENCOUNTER — OFFICE VISIT (OUTPATIENT)
Dept: SPORTS MEDICINE | Facility: CLINIC | Age: 50
End: 2025-03-26
Payer: COMMERCIAL

## 2025-03-26 ENCOUNTER — PATIENT MESSAGE (OUTPATIENT)
Dept: SPORTS MEDICINE | Facility: CLINIC | Age: 50
End: 2025-03-26

## 2025-03-26 VITALS — BODY MASS INDEX: 37.03 KG/M2 | HEIGHT: 69 IN | WEIGHT: 250 LBS

## 2025-03-26 DIAGNOSIS — M10.9 ACUTE GOUT OF LEFT KNEE, UNSPECIFIED CAUSE: Primary | ICD-10-CM

## 2025-03-26 LAB
BACTERIA SPEC ANAEROBE CULT: NORMAL
GRAM STN SPEC: NORMAL
GRAM STN SPEC: NORMAL

## 2025-03-26 PROCEDURE — 99999 PR PBB SHADOW E&M-EST. PATIENT-LVL III: CPT | Mod: PBBFAC,,, | Performed by: ORTHOPAEDIC SURGERY

## 2025-03-26 RX ADMIN — METHYLPREDNISOLONE ACETATE 80 MG: 80 INJECTION, SUSPENSION INTRA-ARTICULAR; INTRALESIONAL; INTRAMUSCULAR; SOFT TISSUE at 04:03

## 2025-03-26 NOTE — PROCEDURES
Large Joint Aspiration/Injection: L knee    Date/Time: 3/26/2025 4:00 PM    Performed by: Brandan Tucker MD  Authorized by: Brandan Tucker MD    Consent Done?:  Yes (Verbal)  Indications:  Joint swelling and pain  Site marked: the procedure site was marked    Timeout: prior to procedure the correct patient, procedure, and site was verified    Prep: patient was prepped and draped in usual sterile fashion      Local anesthesia used?: Yes    Anesthesia:  Local infiltration  Local anesthetic:  Bupivacaine 0.5% without epinephrine, lidocaine 1% without epinephrine and topical anesthetic    Details:  Needle Size:  22 G  Ultrasonic Guidance for needle placement?: No    Approach:  Superior  Location:  Knee  Site:  L knee  Medications:  80 mg methylPREDNISolone acetate 80 mg/mL  Patient tolerance:  Patient tolerated the procedure well with no immediate complications     2cc 1% lidocaine plain, 2cc 0.5% marcaine plain, 0.5cc 80mg methylprednisolone    Procedure Note:  We discussed the risk and benefits of injections, including pain, infection, bleeding, damage to adjacent structures, risk of reaction to injection. We discussed the steroid/cortisone injections will not heal the problem but mat help decrease inflammation and help with symptoms. We discussed the risk of repeated injections. The patient expressed understanding and wanted to proceed with the injection. We performed a timeout to verify the proper patient, proper procedure, and the proper site. The injection site was prepared in a sterile fashion. The patient tolerated it well and there were no complication. We did discuss with the patient that steroid injections can cause some increase in blood sugar and blood pressure for up to a week after the injection.

## 2025-03-26 NOTE — PROGRESS NOTES
"      Patient ID: John Barthelemy  YOB: 1975  MRN: [id number]    Chief Complaint: Pain of the Left Knee    Referred By: [facility]     History of Present Illness: John Barthelemy is a  49 y.o. male   [facility] with a chief complaint of Pain of the Left Knee    Onset: Insidious   Inciting event: Onset 3/21/25  Physical therapy: None  Injections:Right knee CSI 8/23/2016     History of Present Illness    CHIEF COMPLAINT:  - Left knee pain and swelling    HPI:  Jens presents with left knee pain and swelling that began Friday evening. He reports two episodes: the first starting Friday evening, continuing all day Saturday, and improving on Sunday. Symptoms returned Sunday night, causing significant discomfort and preventing sleep. His main issues are mobility limitation, fluid accumulation, and pain, which he believes is "100% associated with that." He states, "It occurs infrequently, but when it does, it significantly impacts my ability to function."    Left knee pain is characterized by difficulty straightening the knee completely due to swelling. Pain and swelling are severe enough to cause mobility limitations and sleep disturbance. He attributes the current flare-up to possible dehydration, stating, "I experience symptoms when I'm dehydrated. The uric acid accumulates in my system due to insufficient flushing."    Jens has a history of knee issues. He was seen by Dr. Valadez in 2019 for the left knee and by Dr. Tucker on August 23 or 24, 2023, for the right knee. During the visit with Dr. Tucker, labs was done to check inflammatory markers, fluid was drawn from the knee, colchicine was prescribed, and a knee sleeve was provided. He reports having minor flare-ups between the last visit with Dr. Tucker and the current episode, including gout in the toe.    For the current episode, he has taken indomethacin but does not have colchicine available. He participated in a " virtual urgent care visit where colchicine and steroids (a 5-day Dosepak) were prescribed, but he has not yet received the medication from the pharmacy.    Jens denies fever, chills, calf pain, numbness, or tingling. He denies any recent trauma or engaging in any strenuous activities that could have triggered the knee pain. Knee sleeve: Provided during visit with Dr. Tucker on August 23rd or 24th, 2023    MEDICATIONS:  - Indomethacin: For current flare-up      ROS:  Constitutional: -fevers, -chills, +sleep disturbances  Musculoskeletal: +joint pain, +joint swelling, +limited movement  Neurological: -numbness         Past Medical History:   Past Medical History:   Diagnosis Date    Chronic left shoulder pain 7/17/2017    Gout      Past Surgical History:   Procedure Laterality Date    COLONOSCOPY N/A 8/30/2023    Procedure: COLONOSCOPY;  Surgeon: Evy Ceja MD;  Location: [facility];  Service: Endoscopy;  Laterality: N/A;    SIALENDOSCOPY Right 8/28/2024    Procedure: SIALENDOSCOPY;  Surgeon: Evert Johnson MD;  Location: [address];  Service: ENT;  Laterality: Right;  Right submandibular  Saliendoscopy set ordered 8/15- IR.     Family History   Problem Relation Name Age of Onset    No Known Problems Mother      Heart disease Father  39        Bypass    No Known Problems Sister      Cancer Paternal Grandmother       Social History[1]  Medication List with Changes/Refills   Current Medications    ALLOPURINOL (ZYLOPRIM) 300 MG TABLET    Take 1 tablet (300 mg total) by mouth once daily.    ATORVASTATIN (LIPITOR) 20 MG TABLET    Take 1 tablet (20 mg total) by mouth once daily.    COLCHICINE (COLCRYS) 0.6 MG TABLET    Take 1 tablet (0.6 mg total) by mouth 3 (three) times daily as needed (For gout.  Decrease dose as symptoms improve). Day 1 - Take two tabs PO now, repeat one tab in 1 hour. Day 2 and thereafter- Then one tab daily until 48 hours after flare resolves.    INDOMETHACIN (INDOCIN) 50 MG  CAPSULE    Take 1 capsule (50 mg total) by mouth every meal as needed (GOUT FLARE). Continue to take for few days after flare resolves.    METHYLPREDNISOLONE (MEDROL DOSEPACK) 4 MG TABLET    use as directed    SEMAGLUTIDE, WEIGHT LOSS, 2.4 MG/0.75 ML PNIJ    COMPOUNDED INJECTION:5mg/mL-3mL (15mg) use as directed maximum 50 units weekly    TERBINAFINE HCL (LAMISIL) 250 MG TABLET    Take 1 tablet (250 mg total) by mouth once daily.     Review of patient's allergies indicates:   Allergen Reactions    Betamethasone Other (See Comments)     Hiccups     ROS    Physical Exam:   Body mass index is 36.92 kg/m².  There were no vitals filed for this visit.   GENERAL: Well appearing, appropriate for stated age, no acute distress.  CARDIOVASCULAR: Pulses regular by peripheral palpation.  PULMONARY: Respirations are even and non-labored.  NEURO: Awake, alert, and oriented x 3.  PSYCH: Mood & affect are appropriate.  HEENT: Head is normocephalic and atraumatic.            Right Knee Exam     Inspection   Swelling: absent    Left Knee Exam     Inspection   Erythema: absent  Swelling: present  Effusion: present    Tenderness   The patient tender to palpation of the medial joint line, patella and lateral joint line.    Range of Motion   Extension:  0   Flexion:  90 abnormal     Tests   Meniscus   Maryann:  Medial - negative Lateral - negative  Stability   Lachman: normal (-1 to 2mm)   MCL - Valgus: normal (0 to 2mm)  LCL - Varus: normal (0 to 2mm)    Other   Sensation: normal    Comments:  Intact EHL, FHL, gastrocsoleus, and tibialis anterior. Sensation intact to light touch in superficial peroneal, deep peroneal, tibial, sural, and saphenous nerve distributions. Foot warm and well perfused with capillary refill of less than 2 seconds and palpable pedal pulses.      Muscle Strength   Left Lower Extremity   Hip Abduction: 5/5   Quadriceps:  5/5   Hamstrin/5     Vascular Exam       Left Pulses  Dorsalis Pedis:      2+  Posterior  Tibial:      2+        Physical Exam    Right Knee: SWELLING IN THE RIGHT KNEE. PUFFINESS IN THE BOTTOM LEFT OF THE RIGHT KNEE. Sensation intact.  IMAGING:  - X-rays of the knee: The knee appears similar to previous imaging. The kneecap (patella) shows a normal patellar tilt, which is common.           Imaging:    X-ray Knee Ortho Left with Flexion (XPD)  Narrative: EXAM: XR KNEE ORTHO LEFT WITH FLEXION (XPD)    CLINICAL HISTORY:  Pain in left knee.    FINDINGS:  There is no fracture or dislocation.  There is a moderate-sized knee joint effusion.  Joint spaces are well-preserved with smooth articular surfaces.  No spurring.  Impression:  Knee joint effusion.    Finalized on: 3/24/2025 6:58 PM By:  Derrick Bustamante MD  [facility]# [id number]      2025-03-24 19:00:31.119     [facility]      Relevant imaging results reviewed and interpreted by me, discussed with the patient and / or family today.     Other Tests:     Assessment & Plan    PLAN SUMMARY:   Order labs to check inflammatory markers and confirm gout diagnosis   Drain fluid from knee for pain relief and lab analysis   Potential steroid injection if gout confirmed and infection ruled out   Return for steroid injection if gout confirmed by fluid analysis    IDIOPATHIC GOUT, LEFT KNEE:   Plan to drain fluid from knee to relieve pressure and pain, and send to lab for analysis.   Ordered labs to check inflammatory markers and confirm gout diagnosis.   Potential steroid injection if gout is confirmed and infection is ruled out.   Return for steroid injection if gout is confirmed by fluid analysis.    PAIN IN LEFT KNEE:   Plan to drain fluid from knee to relieve pressure and pain, and send to lab for analysis.    OTHER INSTABILITY, LEFT KNEE:   Plan to drain fluid from knee to relieve pressure and pain, and send to lab for analysis.         Patient Instructions   Assessment:  John Barthelemy is a  49 y.o. male   [facility] with a chief complaint of Pain of the  Left Knee    Left knee effusion, no injury   Likely Gout flare up due to previous history of gout  Need to rule out septic joint    Encounter Diagnoses   Name Primary?    Acute pain of left knee Yes    Acute gout of left knee, unspecified cause     Effusion of left knee         Plan:  Recommend starting colchicine  already prescribed by urgent care, would recommend follow up with PCP for continued management of gout   Left knee aspiration to rule out infection vs gout  Inflammatory labs panel including Sed rate, CRP, CBC with Diff and Uric Acid ordered today to be completed  Discussed possible CSI if confirmed gout flare up but would like to rule out infection first     Follow-up: 1 week with Maylin Reeves. Please reach out to us sooner if there are any problems between now and then.    About Dr. Garrett Tucker's Research & Publications    Give us Feedback:   Google: Leave Google Review  Healthgrades: Leave Healthgrades Review       Provider Note/Medical Decision Making:     I discussed worrisome and red flag signs and symptoms with the patient. The patient expressed understanding and agreed to alert me immediately or to go to the emergency room if they experience any of these. Treatment plan was developed with input from the patient/family, and they expressed understanding and agreement with the plan. All questions were answered today.          Brandan Tucker MD  Orthopaedic Surgery & Sports Medicine       Disclaimer: This note was prepared using a voice recognition system and is likely to have sound alike errors within the text.     This note was generated with the assistance of ambient listening technology. Verbal consent was obtained by the patient and accompanying visitor(s) for the recording of patient appointment to facilitate this note. I attest to having reviewed and edited the generated note for accuracy, though some syntax or spelling errors may persist. Please contact the author of this note  for any clarification.    I, Elidia Krause, acted as a scribe for Brandan Tucker MD for the duration of this office visit.         [1]   Social History  Socioeconomic History    Marital status:     Number of children: 2   Occupational History    Occupation: Educator   Tobacco Use    Smoking status: Never     Passive exposure: Never    Smokeless tobacco: Never   Substance and Sexual Activity    Alcohol use: Yes     Alcohol/week: 6.0 standard drinks of alcohol     Types: 6 Cans of beer per week     Comment: socially     Drug use: No    Sexual activity: Yes     Partners: Female     Social Drivers of Health     Financial Resource Strain: Low Risk  (3/5/2025)    Overall Financial Resource Strain (CARDIA)     Difficulty of Paying Living Expenses: Not very hard   Food Insecurity: Food Insecurity Present (3/5/2025)    Hunger Vital Sign     Worried About Running Out of Food in the Last Year: Sometimes true     Ran Out of Food in the Last Year: Sometimes true   Transportation Needs: No Transportation Needs (3/5/2025)    PRAPARE - Transportation     Lack of Transportation (Medical): No     Lack of Transportation (Non-Medical): No   Physical Activity: Insufficiently Active (3/5/2025)    Exercise Vital Sign     Days of Exercise per Week: 1 day     Minutes of Exercise per Session: 30 min   Stress: No Stress Concern Present (3/5/2025)    [employer] [place] [employer] - Occupational Stress Questionnaire     Feeling of Stress : Only a little   Housing Stability: Low Risk  (3/5/2025)    Housing Stability Vital Sign     Unable to Pay for Housing in the Last Year: No     Homeless in the Last Year: No

## 2025-03-26 NOTE — TELEPHONE ENCOUNTER
Called patient and offered him a sooner appointment for today instead of Friday to get an injection with Dr. Tucker. Patient confirmed a sooner appointment and understood.

## 2025-03-27 RX ORDER — METHYLPREDNISOLONE ACETATE 80 MG/ML
80 INJECTION, SUSPENSION INTRA-ARTICULAR; INTRALESIONAL; INTRAMUSCULAR; SOFT TISSUE
Status: DISCONTINUED | OUTPATIENT
Start: 2025-03-26 | End: 2025-03-27 | Stop reason: HOSPADM

## 2025-03-28 LAB — BACTERIA SPEC AEROBE CULT: NO GROWTH

## 2025-03-31 ENCOUNTER — PATIENT MESSAGE (OUTPATIENT)
Dept: INTERNAL MEDICINE | Facility: CLINIC | Age: 50
End: 2025-03-31
Payer: COMMERCIAL

## 2025-04-01 ENCOUNTER — TELEPHONE (OUTPATIENT)
Dept: SPORTS MEDICINE | Facility: CLINIC | Age: 50
End: 2025-04-01
Payer: COMMERCIAL

## 2025-04-01 ENCOUNTER — RESULTS FOLLOW-UP (OUTPATIENT)
Dept: SPORTS MEDICINE | Facility: CLINIC | Age: 50
End: 2025-04-01

## 2025-04-01 NOTE — TELEPHONE ENCOUNTER
I called the patient to check in on him per Dr. Tucker and he said that the swelling has gone down tremendously, but he is still having trouble walking and putting weight on that leg. He said the injection site where the needle went in is super sensitive and still bothering him a little bit. I offered him a sooner appointment with either Maylin or Dr. Tucker and he said he wanted to wait a couple days and see if it continues to progress like he thinks it is before he makes a sooner appointment. He appreciated the call though and still has an appointment scheduled with Maylin next Thursday the 10th if he does not decide he wants a sooner appointment.       ----- Message from Brandan Tucker MD sent at 4/1/2025 10:31 AM CDT -----  Please check to see how patient is doing  ----- Message -----  From: Lab, Background User  Sent: 3/24/2025   7:52 PM CDT  To: Brandan Tucker MD

## 2025-04-11 ENCOUNTER — PATIENT MESSAGE (OUTPATIENT)
Dept: FAMILY MEDICINE | Facility: CLINIC | Age: 50
End: 2025-04-11
Payer: COMMERCIAL

## 2025-04-11 DIAGNOSIS — M10.9 ACUTE GOUT OF LEFT KNEE, UNSPECIFIED CAUSE: ICD-10-CM

## 2025-04-11 DIAGNOSIS — M10.9 ACUTE GOUT INVOLVING TOE OF RIGHT FOOT, UNSPECIFIED CAUSE: ICD-10-CM

## 2025-04-14 ENCOUNTER — E-VISIT (OUTPATIENT)
Dept: FAMILY MEDICINE | Facility: CLINIC | Age: 50
End: 2025-04-14
Payer: COMMERCIAL

## 2025-04-14 DIAGNOSIS — M10.071 IDIOPATHIC GOUT INVOLVING TOE OF RIGHT FOOT, UNSPECIFIED CHRONICITY: Primary | ICD-10-CM

## 2025-04-15 RX ORDER — INDOMETHACIN 50 MG/1
CAPSULE ORAL
Qty: 30 CAPSULE | Refills: 0 | OUTPATIENT
Start: 2025-04-15

## 2025-04-15 RX ORDER — COLCHICINE 0.6 MG/1
0.6 TABLET ORAL 2 TIMES DAILY
Qty: 90 TABLET | Refills: 0 | Status: SHIPPED | OUTPATIENT
Start: 2025-04-15

## 2025-04-15 RX ORDER — INDOMETHACIN 50 MG/1
50 CAPSULE ORAL
Qty: 30 CAPSULE | Refills: 0 | Status: SHIPPED | OUTPATIENT
Start: 2025-04-15

## 2025-04-15 NOTE — PROGRESS NOTES
Patient ID: John Barthelemy is a 49 y.o. male.    Chief Complaint: General Illness (Entered automatically based on patient selection in [employer].)    The patient initiated a request through [employer] on 4/14/2025 for evaluation and management with a chief complaint of General Illness (Entered automatically based on patient selection in [employer].)     I evaluated the questionnaire submission on 04/15/2025  .    [facility] Evisit [facility]-Medication    4/14/2025  6:59 PM CDT - Filed by Patient   What do you need help with? Medication Request   Do you agree to participate in an E-Visit? Yes   If you have any of the following symptoms, please present to your local emergency room or call 911:  I acknowledge   Medication requests for narcotics will not be addressed via an E-Visit.  Please schedule an appointment. I acknowledge   Do you want to address a new or existing medication? I would like to address a medication I currently take   What is the main issue you would like addressed today? Gout   Would you like to change or continue your medication? Continue medication   What medication would you like to continue?  Colchicine   Are you taking it as prescribed? Yes    What medical condition is the  medication intended to treat? Gout   Is the medication helping your condition? Yes   Are you having any side effects from the medication? No   Provide any additional information you feel is important. Gout   Please attach any relevant images or files    Are you able to take your vital signs? No         Encounter Diagnosis   Name Primary?    Idiopathic gout involving toe of right foot, unspecified chronicity Yes        No orders of the defined types were placed in this encounter.       Refills sent    No follow-ups on file.      E-Visit Time Tracking:    Day 1 Time (in minutes): 5    Total Time (in minutes): 5

## 2025-05-21 ENCOUNTER — TELEPHONE (OUTPATIENT)
Dept: PHARMACY | Facility: CLINIC | Age: 50
End: 2025-05-21
Payer: COMMERCIAL

## 2025-05-21 NOTE — TELEPHONE ENCOUNTER
[facility]/Population Health Chart Review & Patient Outreach Details For Medication Adherence Project    Reason for Outreach Encounter: 3rd Party payor non-compliance report ([employer], [employer], C, etc)  2.  Patient Outreach Method: [employer] message  3.   Medication in question: atorvastatin   LAST FILLED: 7/19/24 for 90 day supply  Hyperlipidemia Medications              atorvastatin (LIPITOR) 20 MG tablet Take 1 tablet (20 mg total) by mouth once daily.               4.  Reviewed and or Updates Made To: Patient Chart  5. Outreach Outcomes and/or actions taken: Sent inquiry to patient: Waiting for response.

## 2025-06-12 ENCOUNTER — LAB VISIT (OUTPATIENT)
Dept: LAB | Facility: HOSPITAL | Age: 50
End: 2025-06-12
Attending: FAMILY MEDICINE
Payer: COMMERCIAL

## 2025-06-12 ENCOUNTER — OFFICE VISIT (OUTPATIENT)
Dept: FAMILY MEDICINE | Facility: CLINIC | Age: 50
End: 2025-06-12
Payer: COMMERCIAL

## 2025-06-12 VITALS
HEIGHT: 69 IN | BODY MASS INDEX: 33.18 KG/M2 | SYSTOLIC BLOOD PRESSURE: 102 MMHG | TEMPERATURE: 98 F | OXYGEN SATURATION: 98 % | DIASTOLIC BLOOD PRESSURE: 68 MMHG | WEIGHT: 224 LBS | HEART RATE: 73 BPM

## 2025-06-12 DIAGNOSIS — E66.01 SEVERE OBESITY (BMI 35.0-39.9) WITH COMORBIDITY: ICD-10-CM

## 2025-06-12 DIAGNOSIS — M10.071 IDIOPATHIC GOUT INVOLVING TOE OF RIGHT FOOT, UNSPECIFIED CHRONICITY: Primary | ICD-10-CM

## 2025-06-12 DIAGNOSIS — M10.071 IDIOPATHIC GOUT INVOLVING TOE OF RIGHT FOOT, UNSPECIFIED CHRONICITY: ICD-10-CM

## 2025-06-12 LAB
ALBUMIN SERPL BCP-MCNC: 3.9 G/DL (ref 3.5–5.2)
ALP SERPL-CCNC: 55 UNIT/L (ref 40–150)
ALT SERPL W/O P-5'-P-CCNC: 14 UNIT/L (ref 10–44)
ANION GAP (OHS): 7 MMOL/L (ref 8–16)
AST SERPL-CCNC: 14 UNIT/L (ref 11–45)
BILIRUB SERPL-MCNC: 0.2 MG/DL (ref 0.1–1)
BUN SERPL-MCNC: 15 MG/DL (ref 6–20)
CALCIUM SERPL-MCNC: 9.7 MG/DL (ref 8.7–10.5)
CHLORIDE SERPL-SCNC: 105 MMOL/L (ref 95–110)
CHOLEST SERPL-MCNC: 185 MG/DL (ref 120–199)
CHOLEST/HDLC SERPL: 4.6 {RATIO} (ref 2–5)
CO2 SERPL-SCNC: 27 MMOL/L (ref 23–29)
CREAT SERPL-MCNC: 1 MG/DL (ref 0.5–1.4)
GFR SERPLBLD CREATININE-BSD FMLA CKD-EPI: >60 ML/MIN/1.73/M2
GLUCOSE SERPL-MCNC: 81 MG/DL (ref 70–110)
HDLC SERPL-MCNC: 40 MG/DL (ref 40–75)
HDLC SERPL: 21.6 % (ref 20–50)
LDLC SERPL CALC-MCNC: 119.4 MG/DL (ref 63–159)
NONHDLC SERPL-MCNC: 145 MG/DL
POTASSIUM SERPL-SCNC: 4.1 MMOL/L (ref 3.5–5.1)
PROT SERPL-MCNC: 7.7 GM/DL (ref 6–8.4)
SODIUM SERPL-SCNC: 139 MMOL/L (ref 136–145)
TRIGL SERPL-MCNC: 128 MG/DL (ref 30–150)
URATE SERPL-MCNC: 5.4 MG/DL (ref 3.4–7)

## 2025-06-12 PROCEDURE — 36415 COLL VENOUS BLD VENIPUNCTURE: CPT | Mod: PO

## 2025-06-12 PROCEDURE — 99214 OFFICE O/P EST MOD 30 MIN: CPT | Mod: S$GLB,,, | Performed by: FAMILY MEDICINE

## 2025-06-12 PROCEDURE — 99999 PR PBB SHADOW E&M-EST. PATIENT-LVL III: CPT | Mod: PBBFAC,,, | Performed by: FAMILY MEDICINE

## 2025-06-12 PROCEDURE — 84550 ASSAY OF BLOOD/URIC ACID: CPT

## 2025-06-12 PROCEDURE — G2211 COMPLEX E/M VISIT ADD ON: HCPCS | Mod: S$GLB,,, | Performed by: FAMILY MEDICINE

## 2025-06-12 PROCEDURE — 3044F HG A1C LEVEL LT 7.0%: CPT | Mod: CPTII,S$GLB,, | Performed by: FAMILY MEDICINE

## 2025-06-12 PROCEDURE — 3078F DIAST BP <80 MM HG: CPT | Mod: CPTII,S$GLB,, | Performed by: FAMILY MEDICINE

## 2025-06-12 PROCEDURE — 82465 ASSAY BLD/SERUM CHOLESTEROL: CPT

## 2025-06-12 PROCEDURE — 3074F SYST BP LT 130 MM HG: CPT | Mod: CPTII,S$GLB,, | Performed by: FAMILY MEDICINE

## 2025-06-12 PROCEDURE — 1159F MED LIST DOCD IN RCRD: CPT | Mod: CPTII,S$GLB,, | Performed by: FAMILY MEDICINE

## 2025-06-12 PROCEDURE — 82247 BILIRUBIN TOTAL: CPT

## 2025-06-12 PROCEDURE — 3008F BODY MASS INDEX DOCD: CPT | Mod: CPTII,S$GLB,, | Performed by: FAMILY MEDICINE

## 2025-06-12 RX ORDER — PREDNISONE 20 MG/1
20 TABLET ORAL DAILY
Qty: 14 TABLET | Refills: 0 | Status: SHIPPED | OUTPATIENT
Start: 2025-06-12

## 2025-06-12 RX ORDER — TAMSULOSIN HYDROCHLORIDE 0.4 MG/1
1 CAPSULE ORAL DAILY
COMMUNITY
Start: 2024-12-30

## 2025-06-12 NOTE — PROGRESS NOTES
"HISTORY OF PRESENT ILLNESS:  John Barthelemy is a 49 y.o. male who presents to the clinic today for Follow-up  .     Doing great with the glp1RA  Having gout flares.        Problem List[1]        CARE TEAM:  Patient Care Team:  Ariel Aguilar MD as PCP - General (Family Medicine)         ROS        PHYSICAL EXAM:  /68   Pulse 73   Temp 98.1 °F (36.7 °C) (Oral)   Ht 5' 9" (1.753 m)   Wt 101.6 kg (223 lb 15.8 oz)   SpO2 98%   BMI 33.08 kg/m²   Wt Readings from Last 5 Encounters:   06/12/25 101.6 kg (223 lb 15.8 oz)   03/26/25 113.4 kg (250 lb)   03/24/25 113.4 kg (250 lb)   03/12/25 115.8 kg (255 lb 4.7 oz)   08/28/24 117 kg (258 lb)     BP Readings from Last 5 Encounters:   06/12/25 102/68   03/12/25 126/80   08/28/24 109/67   05/31/24 102/68   08/30/23 115/71           He appears well, in no apparent distress.  Alert and oriented times three, pleasant and cooperative. Vital signs are as documented in vital signs section.  S1 and S2 normal, no murmurs, clicks, gallops or rubs. Regular rate and rhythm. Chest is clear; no wheezes or rales. No edema or JVD.        Medication List with Changes/Refills   New Medications    PREDNISONE (DELTASONE) 20 MG TABLET    Take 1 tablet (20 mg total) by mouth once daily.   Current Medications    ALLOPURINOL (ZYLOPRIM) 300 MG TABLET    Take 1 tablet (300 mg total) by mouth once daily.    ATORVASTATIN (LIPITOR) 20 MG TABLET    Take 1 tablet (20 mg total) by mouth once daily.    COLCHICINE (COLCRYS) 0.6 MG TABLET    Take 1 tablet (0.6 mg total) by mouth 2 (two) times daily.    INDOMETHACIN (INDOCIN) 50 MG CAPSULE    Take 1 capsule (50 mg total) by mouth every meal as needed (GOUT FLARE). Continue to take for few days after flare resolves.    SEMAGLUTIDE, WEIGHT LOSS, 2.4 MG/0.75 ML PNIJ    COMPOUNDED INJECTION:5mg/mL-3mL (15mg) use as directed maximum 50 units weekly    TAMSULOSIN (FLOMAX) 0.4 MG CAP    Take 1 capsule by mouth once daily.   Discontinued Medications    " METHYLPREDNISOLONE (MEDROL DOSEPACK) 4 MG TABLET    use as directed       ASSESSMENT AND PLAN:    Problem List Items Addressed This Visit       Gout - Primary    Relevant Medications    predniSONE (DELTASONE) 20 MG tablet    Other Relevant Orders    PTH, Intact    Lipid Panel    Comprehensive Metabolic Panel    Uric Acid    Severe obesity (BMI 35.0-39.9) with comorbidity    Relevant Orders    PTH, Intact    Lipid Panel    Comprehensive Metabolic Panel   Watch it close  He is losing with the glp1RA  Potential medication side effects were discussed with the patient; let me know if any occur.      Future Appointments   Date Time Provider Department Center   12/11/2025  8:00 AM Ariel Aguilar MD [facility]       Follow up in about 3 months (around 9/12/2025). or sooner as needed.               [1]   Patient Active Problem List  Diagnosis    Gout    Severe obesity (BMI 35.0-39.9) with comorbidity    Encounter for screening colonoscopy    Sialadenitis

## 2025-06-13 ENCOUNTER — RESULTS FOLLOW-UP (OUTPATIENT)
Dept: FAMILY MEDICINE | Facility: CLINIC | Age: 50
End: 2025-06-13

## 2025-06-18 ENCOUNTER — PATIENT MESSAGE (OUTPATIENT)
Dept: FAMILY MEDICINE | Facility: CLINIC | Age: 50
End: 2025-06-18
Payer: COMMERCIAL

## 2025-06-19 ENCOUNTER — PATIENT MESSAGE (OUTPATIENT)
Dept: FAMILY MEDICINE | Facility: CLINIC | Age: 50
End: 2025-06-19
Payer: COMMERCIAL

## 2025-06-19 NOTE — TELEPHONE ENCOUNTER
Refill Routing Note   Medication(s) are not appropriate for processing by [facility] for the following reason(s):        Outside of protocol: compounded medications    [facility] action(s):  Route   Requires labs : Yes - A1C            Appointments  past 12m or future 3m with PCP    Date Provider   Last Visit   6/12/2025 Ariel Aguilar MD   Next Visit   12/11/2025 Ariel Aguilar MD   ED visits in past 90 days: 0        Note composed:4:27 PM 06/19/2025

## 2025-06-19 NOTE — TELEPHONE ENCOUNTER
Care Due:                  Date            Visit Type   Department     Provider  --------------------------------------------------------------------------------                                [facility]                              PRIMARY      MEDICINE /  Last Visit: 06-      CARE ([facility])   INTERNAL MED   Ariel Aguilar                              [facility]      MEDICINE /  Next Visit: 12-      CARE ([facility])   INTERNAL MED   Ariel Aguilar                                                            Last  Test          Frequency    Reason                     Performed    Due Date  --------------------------------------------------------------------------------    HBA1C.......  6 months...  semaglutide,.............  03- 09-    [facility] Embedded Care Due Messages. Reference number: [id number].   6/19/2025 3:57:14 PM CDT

## 2025-08-06 ENCOUNTER — PATIENT MESSAGE (OUTPATIENT)
Dept: FAMILY MEDICINE | Facility: CLINIC | Age: 50
End: 2025-08-06
Payer: COMMERCIAL

## 2025-08-21 ENCOUNTER — OFFICE VISIT (OUTPATIENT)
Dept: PRIMARY CARE CLINIC | Facility: CLINIC | Age: 50
End: 2025-08-21
Payer: COMMERCIAL

## 2025-08-21 VITALS
BODY MASS INDEX: 32.67 KG/M2 | OXYGEN SATURATION: 97 % | RESPIRATION RATE: 18 BRPM | HEART RATE: 72 BPM | TEMPERATURE: 97 F | HEIGHT: 69 IN | DIASTOLIC BLOOD PRESSURE: 82 MMHG | SYSTOLIC BLOOD PRESSURE: 110 MMHG | WEIGHT: 220.56 LBS

## 2025-08-21 DIAGNOSIS — E66.09 CLASS 1 OBESITY DUE TO EXCESS CALORIES WITH BODY MASS INDEX (BMI) OF 32.0 TO 32.9 IN ADULT, UNSPECIFIED WHETHER SERIOUS COMORBIDITY PRESENT: Primary | ICD-10-CM

## 2025-08-21 DIAGNOSIS — J00 NASOPHARYNGITIS: ICD-10-CM

## 2025-08-21 DIAGNOSIS — E66.811 CLASS 1 OBESITY DUE TO EXCESS CALORIES WITH BODY MASS INDEX (BMI) OF 32.0 TO 32.9 IN ADULT, UNSPECIFIED WHETHER SERIOUS COMORBIDITY PRESENT: Primary | ICD-10-CM

## 2025-08-21 DIAGNOSIS — J30.9 ALLERGIC RHINITIS, UNSPECIFIED SEASONALITY, UNSPECIFIED TRIGGER: ICD-10-CM

## 2025-08-21 DIAGNOSIS — E78.5 DYSLIPIDEMIA (HIGH LDL; LOW HDL): ICD-10-CM

## 2025-08-21 DIAGNOSIS — M1A.09X1 IDIOPATHIC CHRONIC GOUT OF MULTIPLE SITES WITH TOPHUS: ICD-10-CM

## 2025-08-21 PROBLEM — E66.01 SEVERE OBESITY (BMI 35.0-39.9) WITH COMORBIDITY: Status: RESOLVED | Noted: 2023-06-26 | Resolved: 2025-08-21

## 2025-08-21 PROCEDURE — 99999 PR PBB SHADOW E&M-EST. PATIENT-LVL IV: CPT | Mod: PBBFAC,,, | Performed by: NURSE PRACTITIONER

## 2025-08-21 RX ORDER — AZELASTINE 1 MG/ML
1 SPRAY, METERED NASAL 2 TIMES DAILY
Qty: 90 ML | Refills: 0 | Status: SHIPPED | OUTPATIENT
Start: 2025-08-21 | End: 2026-08-21

## 2025-08-21 RX ORDER — AZITHROMYCIN 250 MG/1
TABLET, FILM COATED ORAL
Qty: 6 TABLET | Refills: 0 | Status: SHIPPED | OUTPATIENT
Start: 2025-08-21 | End: 2025-08-26

## (undated) DEVICE — TOWEL OR DISP STRL BLUE 4/PK

## (undated) DEVICE — DRAPE EENT SPLIT STERILE

## (undated) DEVICE — NDL 22GA X1 1/2 REG BEVEL

## (undated) DEVICE — KIT ANTIFOG W/SPONG & FLUID

## (undated) DEVICE — CORD BIPOLAR 12 FOOT

## (undated) DEVICE — ELECTRODE REM PLYHSV RETURN 9

## (undated) DEVICE — STERILE WATER 1000ML BOTTLE

## (undated) DEVICE — DRAPE CORETEMP FLD WRM 56X62IN

## (undated) DEVICE — NDL HYPO REG 25G X 1 1/2

## (undated) DEVICE — TRAY ENT 4/CS

## (undated) DEVICE — SYR ONLY LUER LOCK 20CC

## (undated) DEVICE — SYR LUER LOCK 1CC

## (undated) DEVICE — SET EXTENSION STERILE 30IN

## (undated) DEVICE — CONTAINER SPECIMEN OR STER 4OZ

## (undated) DEVICE — STOPCOCK DISCOFIX 3 WAY